# Patient Record
Sex: FEMALE | Race: WHITE | Employment: FULL TIME | URBAN - METROPOLITAN AREA
[De-identification: names, ages, dates, MRNs, and addresses within clinical notes are randomized per-mention and may not be internally consistent; named-entity substitution may affect disease eponyms.]

---

## 2017-01-12 DIAGNOSIS — F33.0 MAJOR DEPRESSIVE DISORDER, RECURRENT EPISODE, MILD (H): Primary | ICD-10-CM

## 2017-01-12 RX ORDER — TRAZODONE HYDROCHLORIDE 50 MG/1
50 TABLET, FILM COATED ORAL
Qty: 30 TABLET | Refills: 0 | Status: SHIPPED | OUTPATIENT
Start: 2017-01-12 | End: 2017-03-13

## 2017-01-12 NOTE — TELEPHONE ENCOUNTER
Medication is being filled for 1 time refill only due to:  Patient needs to be seen because it has been more than one year since last visit. Also due for PHQ-9.     Elsa Guan Pharm.D.  on behalf of [facility] Pharmacist   [email or web address]

## 2017-01-12 NOTE — TELEPHONE ENCOUNTER
Trazodone 50mg        Last Written Prescription Date: 1/8/16  Last Fill Quantity: 30; # refills: 11  Last Office Visit with [facility], [facility] or [facility] prescribing provider:  1/8/16        Last PHQ-9 score on record=   PHQ-9 SCORE 1/8/2016   Total Score -   Total Score 1       AST       24   1/8/2016  ALT       36   1/8/2016    Thank you!  Rosa Carrera   [facility]  P: [phone] F: [phone]

## 2017-02-06 DIAGNOSIS — F33.0 MAJOR DEPRESSIVE DISORDER, RECURRENT EPISODE, MILD (H): ICD-10-CM

## 2017-02-06 DIAGNOSIS — K50.90 CROHN'S DISEASE WITHOUT COMPLICATION, UNSPECIFIED GASTROINTESTINAL TRACT LOCATION (H): Primary | ICD-10-CM

## 2017-02-06 RX ORDER — CHOLESTYRAMINE 4 G/9G
1 POWDER, FOR SUSPENSION ORAL DAILY
Qty: 60 G | Refills: 0 | Status: SHIPPED | OUTPATIENT
Start: 2017-02-06 | End: 2017-03-30

## 2017-02-06 RX ORDER — CITALOPRAM HYDROBROMIDE 20 MG/1
20 TABLET ORAL DAILY
Qty: 30 TABLET | Refills: 0 | Status: SHIPPED | OUTPATIENT
Start: 2017-02-06 | End: 2017-03-30

## 2017-02-06 NOTE — TELEPHONE ENCOUNTER
Due for follow up, and there is an interaction with trazodone and celexa (QT prolongation) please advise on refills    Calvin Ambriz, Pharm D  [facility]  [phone]

## 2017-02-06 NOTE — TELEPHONE ENCOUNTER
Chlestyramine 4gm powder     Last Written Prescription Date: 01/08/16  Last Fill Quantity: 60, # refills: 11  Last Office Visit with [facility] or [facility] prescribing provider: 01/08/16       CHOL      172   1/8/2016  HDL       75   1/8/2016  LDL       77   1/8/2016  LDL       77   2/21/2013  TRIG       99   1/8/2016  CHOLHDLRATIO      2.0   1/29/2010    Citalopram   20  Last Written Prescription Date: 01/08/16  Last Fill Quantity: 90, # refills: 3  Last Office Visit with [facility] primary care provider:  01/08/16        Last PHQ-9 score on record=   PHQ-9 SCORE 1/8/2016   Total Score -   Total Score 1         Thank You!  Vivienne Patton  [facility]  P: [phone] F:[phone]

## 2017-03-13 DIAGNOSIS — Z78.0 MENOPAUSE: ICD-10-CM

## 2017-03-13 DIAGNOSIS — F33.0 MAJOR DEPRESSIVE DISORDER, RECURRENT EPISODE, MILD (H): ICD-10-CM

## 2017-03-13 NOTE — TELEPHONE ENCOUNTER
Estradiol 0.05      Last Written Prescription Date: 1/8/16  Last Fill Quantity: 24, # refills: 3  Last Office Visit with [facility], [facility] or [facility] prescribing provider: 1/18/17  Next 5 appointments (look out 90 days)     Apr 07, 2017  9:00 AM CDT   PHYSICAL with Yaima Aguilar MD   [facility] ([facility]    [address] [place] [address]   [phone]                   BP Readings from Last 3 Encounters:   01/08/16 123/74   10/27/14 111/78   05/01/14 104/76     Date of last Breast Exam: 0    Thank You!  Vivienne Patton  [facility]  P: [phone] F:[phone]

## 2017-03-13 NOTE — TELEPHONE ENCOUNTER
Trazodone 50       Last Written Prescription Date: 1/12/17  Last Fill Quantity: 30; # refills: 0  Last Office Visit with [facility], [facility] or  Health prescribing provider:  1/08/17   Next 5 appointments (look out 90 days)     Apr 07, 2017  9:00 AM CDT   PHYSICAL with Yaima Aguilar MD   [facility]    [address] [place] [address]   [phone]                   Last PHQ-9 score on record=   PHQ-9 SCORE 1/8/2016   Total Score -   Total Score 1       Lab Results   Component Value Date    AST 24 01/08/2016     Lab Results   Component Value Date    ALT 36 01/08/2016       Thank You!  Vivienne Patton  [facility]  P: [phone] F:[phone]

## 2017-03-14 RX ORDER — ESTRADIOL 0.05 MG/D
1 PATCH, EXTENDED RELEASE TRANSDERMAL
Qty: 8 PATCH | Refills: 0 | Status: SHIPPED | OUTPATIENT
Start: 2017-03-16 | End: 2017-05-08

## 2017-03-14 RX ORDER — TRAZODONE HYDROCHLORIDE 50 MG/1
50 TABLET, FILM COATED ORAL
Qty: 30 TABLET | Refills: 0 | Status: SHIPPED | OUTPATIENT
Start: 2017-03-14 | End: 2017-05-01

## 2017-03-30 DIAGNOSIS — F33.0 MAJOR DEPRESSIVE DISORDER, RECURRENT EPISODE, MILD (H): ICD-10-CM

## 2017-03-30 DIAGNOSIS — K50.90 CROHN'S DISEASE WITHOUT COMPLICATION, UNSPECIFIED GASTROINTESTINAL TRACT LOCATION (H): ICD-10-CM

## 2017-03-30 RX ORDER — CHOLESTYRAMINE 4 G/9G
1 POWDER, FOR SUSPENSION ORAL DAILY
Qty: 60 G | Refills: 0 | Status: SHIPPED | OUTPATIENT
Start: 2017-03-30 | End: 2017-05-08

## 2017-03-30 RX ORDER — CITALOPRAM HYDROBROMIDE 20 MG/1
20 TABLET ORAL DAILY
Qty: 30 TABLET | Refills: 0 | Status: SHIPPED | OUTPATIENT
Start: 2017-03-30 | End: 2017-05-01

## 2017-03-30 NOTE — TELEPHONE ENCOUNTER
Cholestyramine 4g     Last Written Prescription Date: 2/6/17  Last Fill Quantity: 60, # refills: 0  Last Office Visit with [facility], [facility] or  Health prescribing provider: 1/8/17  Next 5 appointments (look out 90 days)     Apr 07, 2017  9:00 AM CDT   PHYSICAL with Yaima Aguilar MD   [facility] ([facility])    [address] [place] [address]   [phone]                   Lab Results   Component Value Date    CHOL 172 01/08/2016     Lab Results   Component Value Date    HDL 75 01/08/2016     Lab Results   Component Value Date    LDL 77 01/08/2016     Lab Results   Component Value Date    TRIG 99 01/08/2016     Lab Results   Component Value Date    CHOLHDLRATIO 2.0 01/29/2010       Citalopram 20     Last Written Prescription Date: 2/6/17  Last Fill Quantity: 30, # refills: 0  Last Office Visit with [facility] primary care provider:  1/8/17   Next 5 appointments (look out 90 days)     Apr 07, 2017  9:00 AM CDT   PHYSICAL with Yaima Aguilar MD   [facility] ([facility])    [address] [place] [address]   [phone]                   Last PHQ-9 score on record=   PHQ-9 SCORE 1/8/2016   Total Score -   Total Score 1     Thank You!  Vivienne Patton  [facility]  P: [phone] F:[phone]

## 2017-03-30 NOTE — TELEPHONE ENCOUNTER
Routing refill request to provider for review/approval because:  Due for labs/PHQ9.    Narda Wilson, Pharm.D.  [facility]  [phone]

## 2017-05-01 DIAGNOSIS — F33.0 MAJOR DEPRESSIVE DISORDER, RECURRENT EPISODE, MILD (H): ICD-10-CM

## 2017-05-01 NOTE — TELEPHONE ENCOUNTER
citalopram (CELEXA) 20 MG tablet     Last Written Prescription Date: 3/30/17  Last Fill Quantity: 30, # refills: 0  Last Office Visit with [facility] primary care provider:  1/8/17   Next 5 appointments (look out 90 days)     May 08, 2017 10:20 AM CDT   PHYSICAL with Yaima Aguilar MD   [facility]    [address] [place] [address]   [phone]                   Last PHQ-9 score on record=   PHQ-9 SCORE 1/8/2016   Total Score -   Total Score 1         traZODone (DESYREL) 50 MG tablet       Last Written Prescription Date: 3/14/17  Last Fill Quantity: 30; # refills: 0  Last Office Visit with [facility], [facility] or [facility] prescribing provider:  1/18/17   Next 5 appointments (look out 90 days)     May 08, 2017 10:20 AM CDT   PHYSICAL with Yaima Aguilar MD   [facility] ([facility])    [address] [place] [address]   [phone]                   Last PHQ-9 score on record=   PHQ-9 SCORE 1/8/2016   Total Score -   Total Score 1       Lab Results   Component Value Date    AST 24 01/08/2016     Lab Results   Component Value Date    ALT 36 01/08/2016

## 2017-05-02 RX ORDER — CITALOPRAM HYDROBROMIDE 20 MG/1
TABLET ORAL
Qty: 30 TABLET | Refills: 0 | Status: SHIPPED | OUTPATIENT
Start: 2017-05-02 | End: 2017-05-08

## 2017-05-02 RX ORDER — TRAZODONE HYDROCHLORIDE 50 MG/1
TABLET, FILM COATED ORAL
Qty: 30 TABLET | Refills: 0 | Status: SHIPPED | OUTPATIENT
Start: 2017-05-02 | End: 2017-05-08

## 2017-05-08 ENCOUNTER — OFFICE VISIT (OUTPATIENT)
Dept: FAMILY MEDICINE | Facility: CLINIC | Age: 62
End: 2017-05-08
Payer: COMMERCIAL

## 2017-05-08 VITALS
SYSTOLIC BLOOD PRESSURE: 112 MMHG | TEMPERATURE: 97 F | DIASTOLIC BLOOD PRESSURE: 66 MMHG | HEIGHT: 63 IN | HEART RATE: 64 BPM | BODY MASS INDEX: 26.33 KG/M2 | WEIGHT: 148.6 LBS

## 2017-05-08 DIAGNOSIS — Z78.0 MENOPAUSE: ICD-10-CM

## 2017-05-08 DIAGNOSIS — Z11.59 NEED FOR HEPATITIS C SCREENING TEST: ICD-10-CM

## 2017-05-08 DIAGNOSIS — K50.90 CROHN'S DISEASE WITHOUT COMPLICATION, UNSPECIFIED GASTROINTESTINAL TRACT LOCATION (H): ICD-10-CM

## 2017-05-08 DIAGNOSIS — Z00.00 ENCOUNTER FOR WELL ADULT EXAM WITHOUT ABNORMAL FINDINGS: Primary | ICD-10-CM

## 2017-05-08 DIAGNOSIS — F33.0 MAJOR DEPRESSIVE DISORDER, RECURRENT EPISODE, MILD (H): ICD-10-CM

## 2017-05-08 LAB
BASOPHILS # BLD AUTO: 0 10E9/L (ref 0–0.2)
BASOPHILS NFR BLD AUTO: 0.3 %
DIFFERENTIAL METHOD BLD: ABNORMAL
EOSINOPHIL # BLD AUTO: 0.1 10E9/L (ref 0–0.7)
EOSINOPHIL NFR BLD AUTO: 1.9 %
ERYTHROCYTE [DISTWIDTH] IN BLOOD BY AUTOMATED COUNT: 12.6 % (ref 10–15)
HCT VFR BLD AUTO: 38.2 % (ref 35–47)
HGB BLD-MCNC: 12.8 G/DL (ref 11.7–15.7)
LYMPHOCYTES # BLD AUTO: 1.2 10E9/L (ref 0.8–5.3)
LYMPHOCYTES NFR BLD AUTO: 31.1 %
MCH RBC QN AUTO: 29.8 PG (ref 26.5–33)
MCHC RBC AUTO-ENTMCNC: 33.5 G/DL (ref 31.5–36.5)
MCV RBC AUTO: 89 FL (ref 78–100)
MONOCYTES # BLD AUTO: 0.3 10E9/L (ref 0–1.3)
MONOCYTES NFR BLD AUTO: 8 %
NEUTROPHILS # BLD AUTO: 2.2 10E9/L (ref 1.6–8.3)
NEUTROPHILS NFR BLD AUTO: 58.7 %
PLATELET # BLD AUTO: 190 10E9/L (ref 150–450)
RBC # BLD AUTO: 4.29 10E12/L (ref 3.8–5.2)
WBC # BLD AUTO: 3.7 10E9/L (ref 4–11)

## 2017-05-08 PROCEDURE — 36415 COLL VENOUS BLD VENIPUNCTURE: CPT | Performed by: FAMILY MEDICINE

## 2017-05-08 PROCEDURE — 85025 COMPLETE CBC W/AUTO DIFF WBC: CPT | Performed by: FAMILY MEDICINE

## 2017-05-08 PROCEDURE — 99396 PREV VISIT EST AGE 40-64: CPT | Performed by: FAMILY MEDICINE

## 2017-05-08 PROCEDURE — 86803 HEPATITIS C AB TEST: CPT | Performed by: FAMILY MEDICINE

## 2017-05-08 RX ORDER — ESTRADIOL 0.05 MG/D
1 PATCH, EXTENDED RELEASE TRANSDERMAL
Qty: 8 PATCH | Refills: 11 | Status: SHIPPED | OUTPATIENT
Start: 2017-05-08 | End: 2019-09-24

## 2017-05-08 RX ORDER — TRAZODONE HYDROCHLORIDE 50 MG/1
TABLET, FILM COATED ORAL
Qty: 30 TABLET | Refills: 11 | Status: SHIPPED | OUTPATIENT
Start: 2017-05-08 | End: 2018-06-18

## 2017-05-08 RX ORDER — CITALOPRAM HYDROBROMIDE 20 MG/1
20 TABLET ORAL DAILY
Qty: 30 TABLET | Refills: 11 | Status: SHIPPED | OUTPATIENT
Start: 2017-05-08 | End: 2018-06-18

## 2017-05-08 RX ORDER — CHOLESTYRAMINE 4 G/9G
1 POWDER, FOR SUSPENSION ORAL DAILY
Qty: 60 G | Refills: 11 | Status: SHIPPED | OUTPATIENT
Start: 2017-05-08 | End: 2018-05-09

## 2017-05-08 NOTE — PATIENT INSTRUCTIONS
Preventive Health Recommendations  Female Ages 50 - 64    Yearly exam: See your health care provider every year in order to  o Review health changes.   o Discuss preventive care.    o Review your medicines if your doctor has prescribed any.      Get a Pap test every three years (unless you have an abnormal result and your provider advises testing more often).    If you get Pap tests with HPV test, you only need to test every 5 years, unless you have an abnormal result.     You do not need a Pap test if your uterus was removed (hysterectomy) and you have not had cancer.    You should be tested each year for STDs (sexually transmitted diseases) if you're at risk.     Have a mammogram every 1 to 2 years.    Have a colonoscopy at age 50, or have a yearly FIT test (stool test). These exams screen for colon cancer.      Have a cholesterol test every 5 years, or more often if advised.    Have a diabetes test (fasting glucose) every three years. If you are at risk for diabetes, you should have this test more often.     If you are at risk for osteoporosis (brittle bone disease), think about having a bone density scan (DEXA).    Shots: Get a flu shot each year. Get a tetanus shot every 10 years.    Nutrition:     Eat at least 5 servings of fruits and vegetables each day.    Eat whole-grain bread, whole-wheat pasta and brown rice instead of white grains and rice.    Talk to your provider about Calcium and Vitamin D.     Lifestyle    Exercise at least 150 minutes a week (30 minutes a day, 5 days a week). This will help you control your weight and prevent disease.    Limit alcohol to one drink per day.    No smoking.     Wear sunscreen to prevent skin cancer.     See your dentist every six months for an exam and cleaning.    See your eye doctor every 1 to 2 years.      [place] Mammo Schedule   [place]- [phone]  2nd/4th Monday morning   Every other Wednesday afternoon   [place]- [phone]  2nd/4th Wednesday  morning  [place]- [phone]  1st/3rd Wednesday morning   [facility]- [phone]  2nd/4th Monday afternoon   Every other Wednesday afternoon   [place]- [phone]  Every Monday morning   Every Tuesday afternoon   Every Wednesday, Thursday, Friday   Mammogram walk-in hours in [place]: Monday-Friday, 8 a.m. - 4 p.m.  Questions? Call [phone].     Labs today     meds refilled

## 2017-05-08 NOTE — PROGRESS NOTES
SUBJECTIVE:     CC: Jenny Banks is an 61 year old woman who presents for preventive health visit.     The 10-year ASCVD risk score (Nemoaarti MORENO Jr, et al., 2013) is: 1.6%    Values used to calculate the score:      Age: 61 years      Sex: Female      Is Non- : No      Diabetic: No      Tobacco smoker: No      Systolic Blood Pressure: 112 mmHg      Is BP treated: No      HDL Cholesterol: 75 mg/dL      Total Cholesterol: 172 mg/dL    Healthy Habits:    Do you get at least three servings of calcium containing foods daily (dairy, green leafy vegetables, etc.)? yes    Amount of exercise or daily activities, outside of work: 3 day(s) per week    Problems taking medications regularly No    Medication side effects: No    Have you had an eye exam in the past two years? no    Do you see a dentist twice per year? yes    Do you have sleep apnea, excessive snoring or daytime drowsiness?no    Patient is eligible for use of low-dose aspirin for primary prevention of heart attack and stroke.  Provider has discussed aspirin with patient and our decision was:     Contraindicated:  Daily low-dose aspirin for primary prevention contraindicated, provider does not recommend.              Depression Followup    Status since last visit: Stable     See PHQ-9 for current symptoms.  Other associated symptoms: None    Complicating factors:   Significant life event:  No   Current substance abuse:  None  Anxiety or Panic symptoms:  No    PHQ-9  English PHQ-9   Any Language            Today's PHQ-2 Score:   PHQ-2 ( 1999 [employer]) 1/8/2016 10/27/2014   Q1: Little interest or pleasure in doing things 0 0   Q2: Feeling down, depressed or hopeless 0 0   PHQ-2 Score 0 0       Abuse: Current or Past(Physical, Sexual or Emotional)- No  Do you feel safe in your environment - Yes    Social History   Substance Use Topics     Smoking status: Former Smoker     Types: Cigarettes     Quit date: 5/10/1994     Smokeless tobacco: Never  "Used      Comment: quit 1995     Alcohol use No     The patient does not drink >3 drinks per day nor >7 drinks per week.    Recent Labs   Lab Test  01/08/16   1031  02/21/13   0825  01/29/10   1039   CHOL  172   --   182   HDL  75   --   74   LDL  77  77  81   TRIG  99   --   132   CHOLHDLRATIO   --    --   2.0   NHDL  97   --    --        Reviewed orders with patient.  Reviewed health maintenance and updated orders accordingly - Yes    Mammo Decision Support:  Patient over age 50, mutual decision to screen reflected in health maintenance.    Pertinent mammograms are reviewed under the imaging tab.   Status post benign hysterectomy. Health Maintenance and Surgical History updated.    Reviewed and updated as needed this visit by clinical staff  Tobacco  Allergies  Meds  Problems  Med Hx  Surg Hx  Fam Hx  Soc Hx          Reviewed and updated as needed this visit by Provider  Allergies  Meds  Problems            ROS:  C: NEGATIVE for fever, chills, change in weight  I: NEGATIVE for worrisome rashes, moles or lesions  E: NEGATIVE for vision changes or irritation  ENT: NEGATIVE for ear, mouth and throat problems  R: NEGATIVE for significant cough or SOB  B: NEGATIVE for masses, tenderness or discharge  CV: NEGATIVE for chest pain, palpitations or peripheral edema  GI: NEGATIVE for nausea, abdominal pain, heartburn, or change in bowel habits  : NEGATIVE for unusual urinary or vaginal symptoms. No vaginal bleeding.  M: NEGATIVE for significant arthralgias or myalgia  N: NEGATIVE for weakness, dizziness or paresthesias  P: NEGATIVE for changes in mood or affect     Problem list, Medication list, Allergies, and Medical/Social/Surgical histories reviewed in [facility] and updated as appropriate.  OBJECTIVE:     /66 (BP Location: Right arm, Patient Position: Chair, Cuff Size: Adult Large)  Pulse 64  Temp 97  F (36.1  C) (Tympanic)  Ht 5' 3\" (1.6 m)  Wt 148 lb 9.6 oz (67.4 kg)  LMP 06/14/2006  BMI 26.32 " kg/m2  EXAM:  GENERAL: healthy, alert and no distress  EYES: Eyes grossly normal to inspection, PERRL and conjunctivae and sclerae normal  HENT: ear canals and TM's normal, nose and mouth without ulcers or lesions  NECK: no adenopathy, no asymmetry, masses, or scars and thyroid normal to palpation  RESP: lungs clear to auscultation - no rales, rhonchi or wheezes  BREAST: normal without masses, tenderness or nipple discharge and no palpable axillary masses or adenopathy  CV: regular rate and rhythm, normal S1 S2, no S3 or S4, no murmur, click or rub, no peripheral edema and peripheral pulses strong  ABDOMEN: soft, nontender, no hepatosplenomegaly, no masses and bowel sounds normal  MS: no gross musculoskeletal defects noted, no edema  SKIN: no suspicious lesions or rashes  NEURO: Normal strength and tone, mentation intact and speech normal  PSYCH: mentation appears normal, affect normal/bright    ASSESSMENT/PLAN:     1. Encounter for well adult exam without abnormal findings      2. Major depressive disorder, recurrent episode, mild (H)  Stable no change in treatment plan.   - traZODone (DESYREL) 50 MG tablet; TAKE ONE TABLET BY MOUTH NIGHTLY AS NEEDED FOR SLEEP  Dispense: 30 tablet; Refill: 11  - citalopram (CELEXA) 20 MG tablet; Take 1 tablet (20 mg) by mouth daily  Dispense: 30 tablet; Refill: 11    3. Menopause  Stable no change in treatment plan.   - estradiol (VIVELLE-DOT) 0.05 MG/24HR BIW patch; Place 1 patch onto the skin twice a week Due for annual  Dispense: 8 patch; Refill: 11    4. Crohn's disease without complication, unspecified gastrointestinal tract location (H)  Stable no change in treatment plan.   - cholestyramine (QUESTRAN) 4 GM/DOSE powder; Take 1 g by mouth daily 1 scoop  CHOLESTYRAMINE LIGHT POWDER  Dispense: 60 g; Refill: 11  - CBC with platelets differential    5. Need for hepatitis C screening test    - Hepatitis C Screen Reflex to HCV RNA Quant and Genotype    COUNSELING:   Reviewed  "preventive health counseling, as reflected in patient instructions         reports that she quit smoking about 23 years ago. Her smoking use included Cigarettes. She has never used smokeless tobacco.    Estimated body mass index is 26.32 kg/(m^2) as calculated from the following:    Height as of this encounter: 5' 3\" (1.6 m).    Weight as of this encounter: 148 lb 9.6 oz (67.4 kg).       Counseling Resources:  ATP IV Guidelines  Pooled Cohorts Equation Calculator  Breast Cancer Risk Calculator  FRAX Risk Assessment  ICSI Preventive Guidelines  Dietary Guidelines for Americans, 2010  USDA's MyPlate  ASA Prophylaxis  Lung CA Screening    Yaima Aguilar MD  [facility]  "

## 2017-05-08 NOTE — NURSING NOTE
"Chief Complaint   Patient presents with     Physical       Initial /66 (BP Location: Right arm, Patient Position: Chair, Cuff Size: Adult Large)  Pulse 64  Temp 97  F (36.1  C) (Tympanic)  Ht 5' 3\" (1.6 m)  Wt 148 lb 9.6 oz (67.4 kg)  LMP 06/14/2006  BMI 26.32 kg/m2 Estimated body mass index is 26.32 kg/(m^2) as calculated from the following:    Height as of this encounter: 5' 3\" (1.6 m).    Weight as of this encounter: 148 lb 9.6 oz (67.4 kg).  Medication Reconciliation: complete        "

## 2017-05-08 NOTE — MR AVS SNAPSHOT
After Visit Summary   5/8/2017    Jenny Banks    MRN: [id number]           Patient Information     Date Of Birth          1955        Visit Information        Provider Department      5/8/2017 10:20 AM Yaima Aguilar MD [facility]        Today's Diagnoses     Need for hepatitis C screening test    -  1    Encounter for well adult exam without abnormal findings        Major depressive disorder, recurrent episode, mild (H)        Menopause        Crohn's disease without complication, unspecified gastrointestinal tract location (H)          Care Instructions      Preventive Health Recommendations  Female Ages 50 - 64    Yearly exam: See your health care provider every year in order to  o Review health changes.   o Discuss preventive care.    o Review your medicines if your doctor has prescribed any.      Get a Pap test every three years (unless you have an abnormal result and your provider advises testing more often).    If you get Pap tests with HPV test, you only need to test every 5 years, unless you have an abnormal result.     You do not need a Pap test if your uterus was removed (hysterectomy) and you have not had cancer.    You should be tested each year for STDs (sexually transmitted diseases) if you're at risk.     Have a mammogram every 1 to 2 years.    Have a colonoscopy at age 50, or have a yearly FIT test (stool test). These exams screen for colon cancer.      Have a cholesterol test every 5 years, or more often if advised.    Have a diabetes test (fasting glucose) every three years. If you are at risk for diabetes, you should have this test more often.     If you are at risk for osteoporosis (brittle bone disease), think about having a bone density scan (DEXA).    Shots: Get a flu shot each year. Get a tetanus shot every 10 years.    Nutrition:     Eat at least 5 servings of fruits and vegetables each day.    Eat whole-grain bread, whole-wheat pasta and  brown rice instead of white grains and rice.    Talk to your provider about Calcium and Vitamin D.     Lifestyle    Exercise at least 150 minutes a week (30 minutes a day, 5 days a week). This will help you control your weight and prevent disease.    Limit alcohol to one drink per day.    No smoking.     Wear sunscreen to prevent skin cancer.     See your dentist every six months for an exam and cleaning.    See your eye doctor every 1 to 2 years.      [place] Mammo Schedule   [place]- [phone]  2nd/4th Monday morning   Every other Wednesday afternoon   [place]- [phone]  2nd/4th Wednesday morning  [place]- [phone]  1st/3rd Wednesday morning   [facility]- [phone]  2nd/4th Monday afternoon   Every other Wednesday afternoon   [place]- [phone]  Every Monday morning   Every Tuesday afternoon   Every Wednesday, Thursday, Friday   Mammogram walk-in hours in [place]: Monday-Friday, 8 a.m. - 4 p.m.  Questions? Call [phone].     Labs today     meds refilled         Follow-ups after your visit        Who to contact     If you have questions or need follow up information about today's clinic visit or your schedule please contact [facility] directly at [phone].  Normal or non-critical lab and imaging results will be communicated to you by [employer], letter or phone within 4 business days after the clinic has received the results. If you do not hear from us within 7 days, please contact the clinic through [employer] or phone. If you have a critical or abnormal lab result, we will notify you by phone as soon as possible.  Submit refill requests through [employer] or call your pharmacy and they will forward the refill request to us. Please allow 3 business days for your refill to be completed.          Additional Information About Your Visit        [employer] Information     [employer] gives you secure access to your electronic health record. If you see a primary care provider, you can  "also send messages to your care team and make appointments. If you have questions, please call your primary care clinic.  If you do not have a primary care provider, please call [phone] and they will assist you.        Care EveryWhere ID     This is your Care EveryWhere ID. This could be used by other organizations to access your [facility] medical records  [id number]        Your Vitals Were     Pulse Temperature Height Last Period BMI (Body Mass Index)       64 97  F (36.1  C) (Tympanic) 5' 3\" (1.6 m) 06/14/2006 26.32 kg/m2        Blood Pressure from Last 3 Encounters:   05/08/17 112/66   01/08/16 123/74   10/27/14 111/78    Weight from Last 3 Encounters:   05/08/17 148 lb 9.6 oz (67.4 kg)   01/08/16 146 lb 6.4 oz (66.4 kg)   10/27/14 152 lb 3.2 oz (69 kg)              We Performed the Following     CBC with platelets differential     DEPRESSION ACTION PLAN (DAP)     Hepatitis C Screen Reflex to HCV RNA Quant and Genotype          Today's Medication Changes          These changes are accurate as of: 5/8/17 10:51 AM.  If you have any questions, ask your nurse or doctor.               These medicines have changed or have updated prescriptions.        Dose/Directions    citalopram 20 MG tablet   Commonly known as:  celeXA   This may have changed:  See the new instructions.   Used for:  Major depressive disorder, recurrent episode, mild (H)   Changed by:  aYima Aguilar MD        Dose:  20 mg   Take 1 tablet (20 mg) by mouth daily   Quantity:  30 tablet   Refills:  11       traZODone 50 MG tablet   Commonly known as:  DESYREL   This may have changed:  See the new instructions.   Used for:  Major depressive disorder, recurrent episode, mild (H)   Changed by:  Yaima Aguilar MD        TAKE ONE TABLET BY MOUTH NIGHTLY AS NEEDED FOR SLEEP   Quantity:  30 tablet   Refills:  11            Where to get your medicines      These medications were sent to [facility], [place] - " [address] [place] [address]  [address] [place] [address]     Phone:  [phone]     cholestyramine 4 GM/DOSE powder    citalopram 20 MG tablet    estradiol 0.05 MG/24HR BIW patch    traZODone 50 MG tablet                Primary Care Provider Office Phone # Fax #    Yaima Roge Aguilar [phone]       [facility] [address] [place] [address]        Thank you!     Thank you for choosing [facility]  for your care. Our goal is always to provide you with excellent care. Hearing back from our patients is one way we can continue to improve our services. Please take a few minutes to complete the written survey that you may receive in the mail after your visit with us. Thank you!             Your Updated Medication List - Protect others around you: Learn how to safely use, store and throw away your medicines at www.disposemymeds.org.          This list is accurate as of: 5/8/17 10:51 AM.  Always use your most recent med list.                   Brand Name Dispense Instructions for use    Biotin 10 MG Tabs tablet      Take 10,000 mcg by mouth 2 times daily       cholestyramine 4 GM/DOSE powder    QUESTRAN    60 g    Take 1 g by mouth daily 1 scoop  CHOLESTYRAMINE LIGHT POWDER       citalopram 20 MG tablet    celeXA    30 tablet    Take 1 tablet (20 mg) by mouth daily       CLARITIN-D 12 HOUR PO      Take  by mouth.       estradiol 0.05 MG/24HR BIW patch    VIVELLE-DOT    8 patch    Place 1 patch onto the skin twice a week Due for annual       MULTIVITAMIN PO      one daily       traZODone 50 MG tablet    DESYREL    30 tablet    TAKE ONE TABLET BY MOUTH NIGHTLY AS NEEDED FOR SLEEP

## 2017-05-09 LAB — HCV AB SERPL QL IA: NORMAL

## 2017-09-22 ENCOUNTER — ALLIED HEALTH/NURSE VISIT (OUTPATIENT)
Dept: FAMILY MEDICINE | Facility: CLINIC | Age: 62
End: 2017-09-22
Payer: COMMERCIAL

## 2017-09-22 DIAGNOSIS — Z23 NEED FOR PROPHYLACTIC VACCINATION AND INOCULATION AGAINST INFLUENZA: Primary | ICD-10-CM

## 2017-09-22 PROCEDURE — 90471 IMMUNIZATION ADMIN: CPT

## 2017-09-22 PROCEDURE — 99207 ZZC NO CHARGE NURSE ONLY: CPT

## 2017-09-22 PROCEDURE — 90686 IIV4 VACC NO PRSV 0.5 ML IM: CPT

## 2017-09-22 NOTE — MR AVS SNAPSHOT
After Visit Summary   9/22/2017    Jenny Banks    MRN: [id number]           Patient Information     Date Of Birth          1955        Visit Information        Provider Department      9/22/2017 2:30 PM FL DEVON BRAVO/LPN [facility]        Today's Diagnoses     Need for prophylactic vaccination and inoculation against influenza    -  1       Follow-ups after your visit        Who to contact     If you have questions or need follow up information about today's clinic visit or your schedule please contact [facility] directly at [phone].  Normal or non-critical lab and imaging results will be communicated to you by Mobiveryhart, letter or phone within 4 business days after the clinic has received the results. If you do not hear from us within 7 days, please contact the clinic through [employer] or phone. If you have a critical or abnormal lab result, we will notify you by phone as soon as possible.  Submit refill requests through [facility] or call your pharmacy and they will forward the refill request to us. Please allow 3 business days for your refill to be completed.          Additional Information About Your Visit        MyChart Information     [facility] gives you secure access to your electronic health record. If you see a primary care provider, you can also send messages to your care team and make appointments. If you have questions, please call your primary care clinic.  If you do not have a primary care provider, please call [phone] and they will assist you.        Care EveryWhere ID     This is your Care EveryWhere ID. This could be used by other organizations to access your [place] medical records  [id number]        Your Vitals Were     Last Period                   06/14/2006            Blood Pressure from Last 3 Encounters:   05/08/17 112/66   01/08/16 123/74   10/27/14 111/78    Weight from Last 3 Encounters:   05/08/17 148 lb 9.6 oz (67.4 kg)   01/08/16  146 lb 6.4 oz (66.4 kg)   10/27/14 152 lb 3.2 oz (69 kg)              We Performed the Following     FLU VAC, SPLIT VIRUS IM > 3 YO (QUADRIVALENT) [39578]     Vaccine Administration, Initial [16708]        Primary Care Provider Office Phone # Fax #    Yaima Aguilar [phone] [address] [place] [address]        Equal Access to Services     MEHNAZ MANN : Hadii aad ku hadasho Soomaali, waaxda luqadaha, qaybta kaalmada adeegyada, waxay idiin hayaan adeeg kharacristhian lajannette . So [facility] [phone].    ATENCIÓN: Si lokesh curiel, tiene a francis disposición servicios gratuitos de asistencia lingüística. Ll[facility] [phone].    We comply with applicable federal civil rights laws and [place] laws. We do not discriminate on the basis of race, color, national origin, age, disability sex, sexual orientation or gender identity.            Thank you!     Thank you for choosing [facility]  for your care. Our goal is always to provide you with excellent care. Hearing back from our patients is one way we can continue to improve our services. Please take a few minutes to complete the written survey that you may receive in the mail after your visit with us. Thank you!             Your Updated Medication List - Protect others around you: Learn how to safely use, store and throw away your medicines at www.disposemymeds.org.          This list is accurate as of: 9/22/17  2:34 PM.  Always use your most recent med list.                   Brand Name Dispense Instructions for use Diagnosis    Biotin 10 MG Tabs tablet      Take 10,000 mcg by mouth 2 times daily        cholestyramine 4 GM/DOSE powder    QUESTRAN    60 g    Take 1 g by mouth daily 1 scoop  CHOLESTYRAMINE LIGHT POWDER    Crohn's disease without complication, unspecified gastrointestinal tract location (H)       citalopram 20 MG tablet    celeXA    30 tablet    Take 1 tablet (20 mg) by mouth daily    Major depressive disorder,  recurrent episode, mild (H)       CLARITIN-D 12 HOUR PO      Take  by mouth.        estradiol 0.05 MG/24HR BIW patch    VIVELLE-DOT    8 patch    Place 1 patch onto the skin twice a week Due for annual    Menopause       MULTIVITAMIN PO      one daily        traZODone 50 MG tablet    DESYREL    30 tablet    TAKE ONE TABLET BY MOUTH NIGHTLY AS NEEDED FOR SLEEP    Major depressive disorder, recurrent episode, mild (H)

## 2017-09-22 NOTE — PROGRESS NOTES
Injectable Influenza Immunization Documentation    1.  Is the person to be vaccinated sick today?   No    2. Does the person to be vaccinated have an allergy to a component   of the vaccine?   No    3. Has the person to be vaccinated ever had a serious reaction   to influenza vaccine in the past?   No    4. Has the person to be vaccinated ever had Guillain-Barré syndrome?   No    Form completed by sb

## 2017-09-28 ENCOUNTER — TELEPHONE (OUTPATIENT)
Dept: FAMILY MEDICINE | Facility: CLINIC | Age: 62
End: 2017-09-28

## 2017-09-28 NOTE — TELEPHONE ENCOUNTER
Called the patient and reminded her that she is due for a colonoscopy and mammogram this year. Judy Cordova, cma

## 2018-04-30 ENCOUNTER — TELEPHONE (OUTPATIENT)
Dept: FAMILY MEDICINE | Facility: CLINIC | Age: 63
End: 2018-04-30

## 2018-04-30 DIAGNOSIS — R07.0 THROAT PAIN: ICD-10-CM

## 2018-04-30 DIAGNOSIS — R07.0 THROAT PAIN: Primary | ICD-10-CM

## 2018-04-30 LAB
DEPRECATED S PYO AG THROAT QL EIA: NORMAL
SPECIMEN SOURCE: NORMAL

## 2018-04-30 PROCEDURE — 87880 STREP A ASSAY W/OPTIC: CPT | Performed by: FAMILY MEDICINE

## 2018-04-30 PROCEDURE — 87081 CULTURE SCREEN ONLY: CPT | Performed by: FAMILY MEDICINE

## 2018-04-30 NOTE — LETTER
May 1, 2018      Jenny Banks  [address]   [facility] [place] [facility] [address]        Dear Jenny,         This letter is to inform you that the results of your recent throat culture are negative.  If you have any questions please call or make an appointment.          Sincerely,        Yaima Aguilar MD

## 2018-04-30 NOTE — TELEPHONE ENCOUNTER
Dr Aguilar OK'd Rapid TC for Jenny. She states her grandchildren and kids have strep. She has ST and white spots and is going to be going out of town.  Please put in orders. She will come at 1:15 today.

## 2018-05-01 LAB
BACTERIA SPEC CULT: NORMAL
SPECIMEN SOURCE: NORMAL

## 2018-06-18 DIAGNOSIS — F33.0 MAJOR DEPRESSIVE DISORDER, RECURRENT EPISODE, MILD (H): ICD-10-CM

## 2018-06-18 RX ORDER — TRAZODONE HYDROCHLORIDE 50 MG/1
TABLET, FILM COATED ORAL
Qty: 30 TABLET | Refills: 0 | Status: SHIPPED | OUTPATIENT
Start: 2018-06-18 | End: 2018-09-11

## 2018-06-18 RX ORDER — CITALOPRAM HYDROBROMIDE 20 MG/1
TABLET ORAL
Qty: 30 TABLET | Refills: 0 | Status: SHIPPED | OUTPATIENT
Start: 2018-06-18 | End: 2018-07-23

## 2018-06-18 NOTE — TELEPHONE ENCOUNTER
"Requested Prescriptions   Pending Prescriptions Disp Refills     citalopram (CELEXA) 20 MG tablet [Pharmacy Med Name: CITALOPRAM 20MG TAB] 30 tablet 11     Sig: TAKE ONE TABLET BY MOUTH EVERY DAY    SSRIs Protocol Failed    6/18/2018 10:39 AM       Failed - PHQ-9 score less than 5 in past 6 months    Please review last PHQ-9 score.          Failed - Recent (6 mo) or future (30 days) visit within the authorizing provider's specialty    Patient had office visit in the last 6 months or has a visit in the next 30 days with authorizing provider or within the authorizing provider's specialty.  See \"Patient Info\" tab in inbasket, or \"Choose Columns\" in Meds & Orders section of the refill encounter.           Passed - Patient is age 18 or older       Passed - No active pregnancy on record       Passed - No positive pregnancy test in last 12 months        traZODone (DESYREL) 50 MG tablet [Pharmacy Med Name: TRAZODONE HCL 50MG TABS] 30 tablet 11     Sig: TAKE ONE TABLET BY MOUTH NIGHTLY AS NEEDED FOR SLEEP    Serotonin Modulators Failed    6/18/2018 10:39 AM       Failed - Recent (12 mo) or future (30 days) visit within the authorizing provider's specialty    Patient had office visit in the last 12 months or has a visit in the next 30 days with authorizing provider or within the authorizing provider's specialty.  See \"Patient Info\" tab in inbasket, or \"Choose Columns\" in Meds & Orders section of the refill encounter.           Passed - Patient is age 18 or older       Passed - No active pregnancy on record       Passed - No positive pregnancy test in past 12 months      Citalopram, trazadone  Last Written Prescription Date:  5/8/17  Last Fill Quantity: 30,  # refills: 11   Last office visit: 9/22/2017 with prescribing provider:      Future Office Visit:      "

## 2018-07-23 DIAGNOSIS — F33.0 MAJOR DEPRESSIVE DISORDER, RECURRENT EPISODE, MILD (H): ICD-10-CM

## 2018-07-23 NOTE — TELEPHONE ENCOUNTER
Requested Prescriptions   Pending Prescriptions Disp Refills     citalopram (CELEXA) 20 MG tablet  Last Written Prescription Date:  06/18/18  Last Fill Quantity: 30,  # refills: 0   Last office visit: 05/08/2017 with prescribing provider:  OWEN Aguilar   Future Office Visit:     30 tablet 0     Sig: Take 1 tablet (20 mg) by mouth daily    There is no refill protocol information for this order   LUCIANA-7 SCORE 4/24/2014 10/27/2014 1/8/2016   Total Score 2 0 -   Total Score - - 1     PHQ-9 SCORE 10/27/2014 8/26/2015 1/8/2016   Total Score 0 - -   Total Score - 3 1

## 2018-07-24 RX ORDER — CITALOPRAM HYDROBROMIDE 20 MG/1
20 TABLET ORAL DAILY
Qty: 30 TABLET | Refills: 0 | Status: SHIPPED | OUTPATIENT
Start: 2018-07-24 | End: 2018-09-11

## 2018-09-11 ENCOUNTER — OFFICE VISIT (OUTPATIENT)
Dept: FAMILY MEDICINE | Facility: CLINIC | Age: 63
End: 2018-09-11
Payer: COMMERCIAL

## 2018-09-11 VITALS
WEIGHT: 153 LBS | HEIGHT: 63 IN | SYSTOLIC BLOOD PRESSURE: 120 MMHG | BODY MASS INDEX: 27.11 KG/M2 | HEART RATE: 60 BPM | DIASTOLIC BLOOD PRESSURE: 74 MMHG | RESPIRATION RATE: 16 BRPM | TEMPERATURE: 97.4 F

## 2018-09-11 DIAGNOSIS — Z12.11 SPECIAL SCREENING FOR MALIGNANT NEOPLASMS, COLON: ICD-10-CM

## 2018-09-11 DIAGNOSIS — F33.0 MAJOR DEPRESSIVE DISORDER, RECURRENT EPISODE, MILD (H): ICD-10-CM

## 2018-09-11 DIAGNOSIS — Z12.31 ENCOUNTER FOR SCREENING MAMMOGRAM FOR BREAST CANCER: ICD-10-CM

## 2018-09-11 DIAGNOSIS — Z00.00 ENCOUNTER FOR WELL ADULT EXAM WITHOUT ABNORMAL FINDINGS: Primary | ICD-10-CM

## 2018-09-11 DIAGNOSIS — K50.90 CROHN'S DISEASE WITHOUT COMPLICATION, UNSPECIFIED GASTROINTESTINAL TRACT LOCATION (H): ICD-10-CM

## 2018-09-11 LAB
ALBUMIN SERPL-MCNC: 3.8 G/DL (ref 3.4–5)
ALP SERPL-CCNC: 77 U/L (ref 40–150)
ALT SERPL W P-5'-P-CCNC: 39 U/L (ref 0–50)
ANION GAP SERPL CALCULATED.3IONS-SCNC: 3 MMOL/L (ref 3–14)
AST SERPL W P-5'-P-CCNC: 31 U/L (ref 0–45)
BASOPHILS # BLD AUTO: 0 10E9/L (ref 0–0.2)
BASOPHILS NFR BLD AUTO: 0.4 %
BILIRUB DIRECT SERPL-MCNC: <0.1 MG/DL (ref 0–0.2)
BILIRUB SERPL-MCNC: 0.3 MG/DL (ref 0.2–1.3)
BUN SERPL-MCNC: 19 MG/DL (ref 7–30)
CALCIUM SERPL-MCNC: 8.4 MG/DL (ref 8.5–10.1)
CHLORIDE SERPL-SCNC: 105 MMOL/L (ref 94–109)
CO2 SERPL-SCNC: 30 MMOL/L (ref 20–32)
CREAT SERPL-MCNC: 0.85 MG/DL (ref 0.52–1.04)
DIFFERENTIAL METHOD BLD: NORMAL
EOSINOPHIL # BLD AUTO: 0.1 10E9/L (ref 0–0.7)
EOSINOPHIL NFR BLD AUTO: 2.8 %
ERYTHROCYTE [DISTWIDTH] IN BLOOD BY AUTOMATED COUNT: 12.2 % (ref 10–15)
GFR SERPL CREATININE-BSD FRML MDRD: 68 ML/MIN/1.7M2
GLUCOSE SERPL-MCNC: 88 MG/DL (ref 70–99)
HCT VFR BLD AUTO: 38.2 % (ref 35–47)
HGB BLD-MCNC: 13 G/DL (ref 11.7–15.7)
LYMPHOCYTES # BLD AUTO: 1.2 10E9/L (ref 0.8–5.3)
LYMPHOCYTES NFR BLD AUTO: 25.4 %
MCH RBC QN AUTO: 30.6 PG (ref 26.5–33)
MCHC RBC AUTO-ENTMCNC: 34 G/DL (ref 31.5–36.5)
MCV RBC AUTO: 90 FL (ref 78–100)
MONOCYTES # BLD AUTO: 0.4 10E9/L (ref 0–1.3)
MONOCYTES NFR BLD AUTO: 8.1 %
NEUTROPHILS # BLD AUTO: 3 10E9/L (ref 1.6–8.3)
NEUTROPHILS NFR BLD AUTO: 63.3 %
PLATELET # BLD AUTO: 205 10E9/L (ref 150–450)
POTASSIUM SERPL-SCNC: 4.7 MMOL/L (ref 3.4–5.3)
PROT SERPL-MCNC: 7.4 G/DL (ref 6.8–8.8)
RBC # BLD AUTO: 4.25 10E12/L (ref 3.8–5.2)
SODIUM SERPL-SCNC: 138 MMOL/L (ref 133–144)
TSH SERPL DL<=0.005 MIU/L-ACNC: 1.03 MU/L (ref 0.4–4)
WBC # BLD AUTO: 4.7 10E9/L (ref 4–11)

## 2018-09-11 PROCEDURE — 99396 PREV VISIT EST AGE 40-64: CPT | Performed by: FAMILY MEDICINE

## 2018-09-11 PROCEDURE — 84443 ASSAY THYROID STIM HORMONE: CPT | Performed by: FAMILY MEDICINE

## 2018-09-11 PROCEDURE — 80076 HEPATIC FUNCTION PANEL: CPT | Performed by: FAMILY MEDICINE

## 2018-09-11 PROCEDURE — 85025 COMPLETE CBC W/AUTO DIFF WBC: CPT | Performed by: FAMILY MEDICINE

## 2018-09-11 PROCEDURE — 80048 BASIC METABOLIC PNL TOTAL CA: CPT | Performed by: FAMILY MEDICINE

## 2018-09-11 PROCEDURE — 36415 COLL VENOUS BLD VENIPUNCTURE: CPT | Performed by: FAMILY MEDICINE

## 2018-09-11 RX ORDER — CITALOPRAM HYDROBROMIDE 20 MG/1
20 TABLET ORAL DAILY
Qty: 30 TABLET | Refills: 0 | Status: SHIPPED | OUTPATIENT
Start: 2018-09-11 | End: 2018-12-11

## 2018-09-11 RX ORDER — TRAZODONE HYDROCHLORIDE 50 MG/1
TABLET, FILM COATED ORAL
Qty: 90 TABLET | Refills: 3 | Status: SHIPPED | OUTPATIENT
Start: 2018-09-11 | End: 2018-12-20

## 2018-09-11 RX ORDER — CHOLESTYRAMINE LIGHT 4 G/5.7G
POWDER, FOR SUSPENSION ORAL
Qty: 239.4 G | Refills: 11 | Status: SHIPPED | OUTPATIENT
Start: 2018-09-11 | End: 2018-12-20

## 2018-09-11 RX ORDER — CHOLESTYRAMINE LIGHT 4 G/5.7G
POWDER, FOR SUSPENSION ORAL
Qty: 239.4 G | Refills: 11 | Status: CANCELLED | OUTPATIENT
Start: 2018-09-11

## 2018-09-11 ASSESSMENT — PATIENT HEALTH QUESTIONNAIRE - PHQ9
SUM OF ALL RESPONSES TO PHQ QUESTIONS 1-9: 1
SUM OF ALL RESPONSES TO PHQ QUESTIONS 1-9: 1
10. IF YOU CHECKED OFF ANY PROBLEMS, HOW DIFFICULT HAVE THESE PROBLEMS MADE IT FOR YOU TO DO YOUR WORK, TAKE CARE OF THINGS AT HOME, OR GET ALONG WITH OTHER PEOPLE: NOT DIFFICULT AT ALL

## 2018-09-11 ASSESSMENT — ANXIETY QUESTIONNAIRES
7. FEELING AFRAID AS IF SOMETHING AWFUL MIGHT HAPPEN: NOT AT ALL
6. BECOMING EASILY ANNOYED OR IRRITABLE: NOT AT ALL
GAD7 TOTAL SCORE: 0
GAD7 TOTAL SCORE: 0
3. WORRYING TOO MUCH ABOUT DIFFERENT THINGS: NOT AT ALL
2. NOT BEING ABLE TO STOP OR CONTROL WORRYING: NOT AT ALL
7. FEELING AFRAID AS IF SOMETHING AWFUL MIGHT HAPPEN: NOT AT ALL
4. TROUBLE RELAXING: NOT AT ALL
5. BEING SO RESTLESS THAT IT IS HARD TO SIT STILL: NOT AT ALL
1. FEELING NERVOUS, ANXIOUS, OR ON EDGE: NOT AT ALL
GAD7 TOTAL SCORE: 0

## 2018-09-11 NOTE — PROGRESS NOTES
SUBJECTIVE:   CC: Jenny Banks is an 62 year old woman who presents for preventive health visit.     Answers for HPI/ROS submitted by the patient on 9/11/2018   Annual Exam:  Getting at least 3 servings of Calcium per day:: Yes  Bi-annual eye exam:: Yes  Dental care twice a year:: Yes  Sleep apnea or symptoms of sleep apnea:: Excessive snoring  Diet:: Gluten-free/reduced  Frequency of exercise:: None  Taking medications regularly:: Yes  Medication side effects:: Not applicable  Additional concerns today:: No  PHQ-2 Score: 0  If you checked off any problems, how difficult have these problems made it for you to do your work, take care of things at home, or get along with other people?: Not difficult at all  PHQ9 TOTAL SCORE: 1  LUCIANA 7 TOTAL SCORE: 0          Today's PHQ-2 Score:   PHQ-2 ( 1999 Pfizer) 9/11/2018 1/8/2016   Q1: Little interest or pleasure in doing things 0 0   Q2: Feeling down, depressed or hopeless 0 0   PHQ-2 Score 0 0   Q1: Little interest or pleasure in doing things Not at all -   Q2: Feeling down, depressed or hopeless Not at all -   PHQ-2 Score 0 -       Abuse: Current or Past(Physical, Sexual or Emotional)- No  Do you feel safe in your environment - Yes    Social History   Substance Use Topics     Smoking status: Former Smoker     Types: Cigarettes     Quit date: 5/10/1994     Smokeless tobacco: Never Used      Comment: quit 1995     Alcohol use No     If you drink alcohol do you typically have >3 drinks per day or >7 drinks per week? No                     Reviewed orders with patient.  Reviewed health maintenance and updated orders accordingly - Yes  Labs reviewed in [facility]    Patient over age 50, mutual decision to screen reflected in health maintenance.    Pertinent mammograms are reviewed under the imaging tab.  History of abnormal Pap smear: NO - age 30- 65 PAP every 3 years recommended  PAP / HPV 12/13/2007 4/4/2006   PAP NIL NIL     Reviewed and updated as needed this visit by  "clinical staff  Tobacco  Allergies  Meds  Problems  Med Hx  Surg Hx  Fam Hx  Soc Hx          Reviewed and updated as needed this visit by Provider  Allergies  Meds  Problems            ROS:  CONSTITUTIONAL: NEGATIVE for fever, chills, change in weight  INTEGUMENTARY/SKIN: NEGATIVE for worrisome rashes, moles or lesions  EYES: NEGATIVE for vision changes or irritation  ENT: NEGATIVE for ear, mouth and throat problems  RESP: NEGATIVE for significant cough or SOB  BREAST: NEGATIVE for masses, tenderness or discharge  CV: NEGATIVE for chest pain, palpitations or peripheral edema  GI: NEGATIVE for nausea, abdominal pain, heartburn, or change in bowel habits  : NEGATIVE for unusual urinary or vaginal symptoms. No vaginal bleeding.  MUSCULOSKELETAL: NEGATIVE for significant arthralgias or myalgia  NEURO: NEGATIVE for weakness, dizziness or paresthesias  PSYCHIATRIC: NEGATIVE for changes in mood or affect     OBJECTIVE:   /74 (BP Location: Right arm, Patient Position: Sitting, Cuff Size: Adult Regular)  Pulse 60  Temp 97.4  F (36.3  C) (Tympanic)  Resp 16  Ht 5' 3\" (1.6 m)  Wt 153 lb (69.4 kg)  LMP 06/14/2006  BMI 27.1 kg/m2  EXAM:  GENERAL: healthy, alert and no distress  EYES: Eyes grossly normal to inspection, PERRL and conjunctivae and sclerae normal  HENT: ear canals and TM's normal, nose and mouth without ulcers or lesions  NECK: no adenopathy, no asymmetry, masses, or scars and thyroid normal to palpation  RESP: lungs clear to auscultation - no rales, rhonchi or wheezes  BREAST: normal without masses, tenderness or nipple discharge and no palpable axillary masses or adenopathy  CV: regular rate and rhythm, normal S1 S2, no S3 or S4, no murmur, click or rub, no peripheral edema and peripheral pulses strong  ABDOMEN: soft, nontender, no hepatosplenomegaly, no masses and bowel sounds normal  MS: no gross musculoskeletal defects noted, no edema  SKIN: no suspicious lesions or rashes  NEURO: " "Normal strength and tone, mentation intact and speech normal  PSYCH: mentation appears normal, affect normal/bright    Diagnostic Test Results:  none     ASSESSMENT/PLAN:   1. Encounter for well adult exam without abnormal findings      2. Major depressive disorder, recurrent episode, mild (H)  Stable no change in treatment plan.   - citalopram (CELEXA) 20 MG tablet; Take 1 tablet (20 mg) by mouth daily No further refills until seen in clinic  Dispense: 30 tablet; Refill: 0  - traZODone (DESYREL) 50 MG tablet; TAKE ONE TABLET BY MOUTH NIGHTLY AS NEEDED FOR SLEEP  Dispense: 90 tablet; Refill: 3  - TSH with free T4 reflex    3. Crohn's disease without complication, unspecified gastrointestinal tract location (H)  Stable no change in treatment plan.   - cholestyramine light (PREVALITE) 4 GM powder; MIX ONE SCOOP (4 GRAMS) AND DRINK ONCE DAILY AS DIRECTED  Dispense: 239.4 g; Refill: 11  - Hepatic panel  - Basic metabolic panel  - CBC with platelets differential    4. Special screening for malignant neoplasms, colon    - GASTROENTEROLOGY ADULT REF PROCEDURE ONLY    5. Encounter for screening mammogram for breast cancer    - *MA Screening Digital Bilateral; Future    COUNSELING:   Reviewed preventive health counseling, as reflected in patient instructions    BP Readings from Last 1 Encounters:   09/11/18 120/74     Estimated body mass index is 27.1 kg/(m^2) as calculated from the following:    Height as of this encounter: 5' 3\" (1.6 m).    Weight as of this encounter: 153 lb (69.4 kg).      Weight management plan: Discussed healthy diet and exercise guidelines and patient will follow up in 12 months in clinic to re-evaluate.     reports that she quit smoking about 24 years ago. Her smoking use included Cigarettes. She has never used smokeless tobacco.      Counseling Resources:  ATP IV Guidelines  Pooled Cohorts Equation Calculator  Breast Cancer Risk Calculator  FRAX Risk Assessment  ICSI Preventive Guidelines  Dietary " Guidelines for Americans, 2010  USDA's MyPlate  ASA Prophylaxis  Lung CA Screening    Yaima Aguilar MD  [facility]

## 2018-09-11 NOTE — NURSING NOTE
"Chief Complaint   Patient presents with     Physical       Initial /74 (BP Location: Right arm, Patient Position: Sitting, Cuff Size: Adult Regular)  Pulse 60  Temp 97.4  F (36.3  C) (Tympanic)  Resp 16  Ht 5' 3\" (1.6 m)  Wt 153 lb (69.4 kg)  LMP 06/14/2006  BMI 27.1 kg/m2 Estimated body mass index is 27.1 kg/(m^2) as calculated from the following:    Height as of this encounter: 5' 3\" (1.6 m).    Weight as of this encounter: 153 lb (69.4 kg).      Health Maintenance that is potentially due pending provider review:  Mammogram and Colonoscopy/FIT    Pt will schedule mammogram colonscopy appt.    Is there anyone who you would like to be able to receive your results? No  If yes have patient fill out LIBERTAD    Lesli Mina MA Student    "

## 2018-09-11 NOTE — MR AVS SNAPSHOT
After Visit Summary   9/11/2018    Jenny Banks    MRN: [id number]           Patient Information     Date Of Birth          1955        Visit Information        Provider Department      9/11/2018 12:40 PM Yaima Aguilar MD [facility]        Today's Diagnoses     Encounter for well adult exam without abnormal findings    -  1    Major depressive disorder, recurrent episode, mild (H)        Crohn's disease without complication, unspecified gastrointestinal tract location (H)        Special screening for malignant neoplasms, colon        Encounter for screening mammogram for breast cancer          Care Instructions      Preventive Health Recommendations  Female Ages 50 - 64    Yearly exam: See your health care provider every year in order to  o Review health changes.   o Discuss preventive care.    o Review your medicines if your doctor has prescribed any.      Get a Pap test every three years (unless you have an abnormal result and your provider advises testing more often).    If you get Pap tests with HPV test, you only need to test every 5 years, unless you have an abnormal result.     You do not need a Pap test if your uterus was removed (hysterectomy) and you have not had cancer.    You should be tested each year for STDs (sexually transmitted diseases) if you're at risk.     Have a mammogram every 1 to 2 years.    Have a colonoscopy at age 50, or have a yearly FIT test (stool test). These exams screen for colon cancer.      Have a cholesterol test every 5 years, or more often if advised.    Have a diabetes test (fasting glucose) every three years. If you are at risk for diabetes, you should have this test more often.     If you are at risk for osteoporosis (brittle bone disease), think about having a bone density scan (DEXA).    Shots: Get a flu shot each year. Get a tetanus shot every 10 years.    Nutrition:     Eat at least 5 servings of fruits and vegetables  each day.    Eat whole-grain bread, whole-wheat pasta and brown rice instead of white grains and rice.    Get adequate Calcium and Vitamin D.     Lifestyle    Exercise at least 150 minutes a week (30 minutes a day, 5 days a week). This will help you control your weight and prevent disease.    Limit alcohol to one drink per day.    No smoking.     Wear sunscreen to prevent skin cancer.     See your dentist every six months for an exam and cleaning.    See your eye doctor every 1 to 2 years.      Labs today     Set up mammogram   Set up colonoscopy           Follow-ups after your visit        Additional Services     GASTROENTEROLOGY ADULT REF PROCEDURE ONLY       Last Lab Result: Creatinine (mg/dL)       Date                     Value                 01/08/2016               0.73             ----------  Body mass index is 27.1 kg/(m^2).     Needed:  No  Language:  English    Patient will be contacted to schedule procedure.     Please be aware that coverage of these services is subject to the terms and limitations of your health insurance plan.  Call member services at your health plan with any benefit or coverage questions.  Any procedures must be performed at a [facility] facility OR coordinated by your clinic's referral office.    Please bring the following with you to your appointment:    (1) Any X-Rays, CTs or MRIs which have been performed.  Contact the facility where they were done to arrange for  prior to your scheduled appointment.    (2) List of current medications   (3) This referral request   (4) Any documents/labs given to you for this referral                  Follow-up notes from your care team     Return in about 1 year (around 9/11/2019) for Physical Exam.      Your next 10 appointments already scheduled     Oct 01, 2018 10:30 AM CDT   MA SCREENING DIGITAL BILATERAL with [facility]   [facility]    [address] [place] [address]  "[place] [id number]   [phone]           Do not use any powder, lotion or deodorant under your arms or on your breast. If you do, we will ask you to remove it before your exam.  Wear comfortable, two-piece clothing.  If you have any allergies, tell your care team.  Bring any previous mammograms from other facilities or have them mailed to the breast center.              Who to contact     If you have questions or need follow up information about today's clinic visit or your schedule please contact [facility] directly at [phone].  Normal or non-critical lab and imaging results will be communicated to you by [employer], letter or phone within 4 business days after the clinic has received the results. If you do not hear from us within 7 days, please contact the clinic through [email or web address] or phone. If you have a critical or abnormal lab result, we will notify you by phone as soon as possible.  Submit refill requests through [email or web address] or call your pharmacy and they will forward the refill request to us. Please allow 3 business days for your refill to be completed.          Additional Information About Your Visit        MyChart Information     [email or web address] gives you secure access to your electronic health record. If you see a primary care provider, you can also send messages to your care team and make appointments. If you have questions, please call your primary care clinic.  If you do not have a primary care provider, please call [phone] and they will assist you.        Care EveryWhere ID     This is your Care EveryWhere ID. This could be used by other organizations to access your [place] medical records  [id number]        Your Vitals Were     Pulse Temperature Respirations Height Last Period BMI (Body Mass Index)    60 97.4  F (36.3  C) (Tympanic) 16 5' 3\" (1.6 m) 06/14/2006 27.1 kg/m2       Blood Pressure from Last 3 Encounters:   09/11/18 120/74   05/08/17 112/66   01/08/16 123/74    Weight from Last 3 " Encounters:   09/11/18 153 lb (69.4 kg)   05/08/17 148 lb 9.6 oz (67.4 kg)   01/08/16 146 lb 6.4 oz (66.4 kg)              We Performed the Following     Basic metabolic panel     CBC with platelets differential     GASTROENTEROLOGY ADULT REF PROCEDURE ONLY     Hepatic panel     TSH with free T4 reflex          Where to get your medicines      These medications were sent to [facility] [place] [id number]     Phone:  [phone]     cholestyramine light 4 GM powder    citalopram 20 MG tablet    traZODone 50 MG tablet          Primary Care Provider Office Phone # Fax #    Yaima Aguilar -[phone]       [address] [place] [address]        Equal Access to Services     MEHNAZ MANN : Khoa Love, waaxda luqadaha, qaybta kaalmada federica, alessandro simons . So [facility] [phone].    ATENCIÓN: Si habla español, tiene a francis disposición servicios gratuitos de asistencia lingüística. Llame al [phone].    We comply with applicable federal civil rights laws and [place] laws. We do not discriminate on the basis of race, color, national origin, age, disability, sex, sexual orientation, or gender identity.            Thank you!     Thank you for choosing [facility]  for your care. Our goal is always to provide you with excellent care. Hearing back from our patients is one way we can continue to improve our services. Please take a few minutes to complete the written survey that you may receive in the mail after your visit with us. Thank you!             Your Updated Medication List - Protect others around you: Learn how to safely use, store and throw away your medicines at www.disposemymeds.org.          This list is accurate as of 9/11/18 11:59 PM.  Always use your most recent med list.                   Brand Name Dispense Instructions for use  Diagnosis    Biotin 10 MG Tabs tablet      Take 10,000 mcg by mouth 2 times daily        cholestyramine light 4 GM powder    PREVALITE    239.4 g    MIX ONE SCOOP (4 GRAMS) AND DRINK ONCE DAILY AS DIRECTED    Crohn's disease without complication, unspecified gastrointestinal tract location (H)       citalopram 20 MG tablet    celeXA    30 tablet    Take 1 tablet (20 mg) by mouth daily No further refills until seen in clinic    Major depressive disorder, recurrent episode, mild (H)       CLARITIN-D 12 HOUR PO      Take  by mouth.        estradiol 0.05 MG/24HR BIW patch    VIVELLE-DOT    8 patch    Place 1 patch onto the skin twice a week Due for annual    Menopause       MULTIVITAMIN PO      one daily        traZODone 50 MG tablet    DESYREL    90 tablet    TAKE ONE TABLET BY MOUTH NIGHTLY AS NEEDED FOR SLEEP    Major depressive disorder, recurrent episode, mild (H)

## 2018-09-11 NOTE — PATIENT INSTRUCTIONS

## 2018-09-12 ASSESSMENT — PATIENT HEALTH QUESTIONNAIRE - PHQ9: SUM OF ALL RESPONSES TO PHQ QUESTIONS 1-9: 1

## 2018-09-12 ASSESSMENT — ANXIETY QUESTIONNAIRES: GAD7 TOTAL SCORE: 0

## 2018-10-17 ENCOUNTER — RADIANT APPOINTMENT (OUTPATIENT)
Dept: MAMMOGRAPHY | Facility: CLINIC | Age: 63
End: 2018-10-17
Attending: FAMILY MEDICINE
Payer: COMMERCIAL

## 2018-10-17 DIAGNOSIS — Z12.31 ENCOUNTER FOR SCREENING MAMMOGRAM FOR BREAST CANCER: ICD-10-CM

## 2018-10-17 PROCEDURE — 77067 SCR MAMMO BI INCL CAD: CPT | Mod: TC

## 2018-10-23 ENCOUNTER — TELEPHONE (OUTPATIENT)
Dept: GENERAL RADIOLOGY | Facility: CLINIC | Age: 63
End: 2018-10-23

## 2018-10-23 NOTE — TELEPHONE ENCOUNTER
"Patient Communication Preferences indicates \"DO not contact\" and/or communication by \"Phone\" is not preferred.  Call not required per Outreach team.  "

## 2018-12-11 DIAGNOSIS — F33.0 MAJOR DEPRESSIVE DISORDER, RECURRENT EPISODE, MILD (H): ICD-10-CM

## 2018-12-11 RX ORDER — CITALOPRAM HYDROBROMIDE 20 MG/1
20 TABLET ORAL DAILY
Qty: 90 TABLET | Refills: 1 | Status: SHIPPED | OUTPATIENT
Start: 2018-12-11 | End: 2018-12-20

## 2018-12-11 NOTE — TELEPHONE ENCOUNTER
Requested Prescriptions   Pending Prescriptions Disp Refills     citalopram (CELEXA) 20 MG tablet 30 tablet 0     Sig: Take 1 tablet (20 mg) by mouth daily No further refills until seen in clinic    There is no refill protocol information for this order        Last Written Prescription Date:  9/11/18  Last Fill Quantity: 30,  # refills: 0   Last office visit: 9/11/2018 with prescribing provider:      Future Office Visit:

## 2018-12-20 DIAGNOSIS — K50.90 CROHN'S DISEASE WITHOUT COMPLICATION, UNSPECIFIED GASTROINTESTINAL TRACT LOCATION (H): ICD-10-CM

## 2018-12-20 DIAGNOSIS — F33.0 MAJOR DEPRESSIVE DISORDER, RECURRENT EPISODE, MILD (H): ICD-10-CM

## 2018-12-20 RX ORDER — CHOLESTYRAMINE LIGHT 4 G/5.7G
POWDER, FOR SUSPENSION ORAL
Qty: 239.4 G | Refills: 11 | Status: SHIPPED | OUTPATIENT
Start: 2018-12-20 | End: 2019-10-24

## 2018-12-20 RX ORDER — CITALOPRAM HYDROBROMIDE 20 MG/1
20 TABLET ORAL DAILY
Qty: 90 TABLET | Refills: 1 | Status: SHIPPED | OUTPATIENT
Start: 2018-12-20 | End: 2019-09-24

## 2018-12-20 RX ORDER — TRAZODONE HYDROCHLORIDE 50 MG/1
TABLET, FILM COATED ORAL
Qty: 90 TABLET | Refills: 3 | Status: SHIPPED | OUTPATIENT
Start: 2018-12-20 | End: 2019-09-24

## 2018-12-20 NOTE — TELEPHONE ENCOUNTER
"Pt thought meds had all been refilled at appointment in September. She is out of the cholestryramine light. Please sent through today.    Requested Prescriptions   Pending Prescriptions Disp Refills     citalopram (CELEXA) 20 MG tablet 90 tablet 1     Sig: Take 1 tablet (20 mg) by mouth daily No further refills until seen in clinic    SSRIs Protocol Passed - 12/20/2018  8:14 AM       Passed - PHQ-9 score less than 5 in past 6 months    Please review last PHQ-9 score.          Passed - Patient is age 18 or older       Passed - No active pregnancy on record       Passed - No positive pregnancy test in last 12 months       Passed - Recent (6 mo) or future (30 days) visit within the authorizing provider's specialty    Patient had office visit in the last 6 months or has a visit in the next 30 days with authorizing provider or within the authorizing provider's specialty.  See \"Patient Info\" tab in inbasket, or \"Choose Columns\" in Meds & Orders section of the refill encounter.       .  Last Written Prescription Date:  12/11/18  Last Fill Quantity: 90,  # refills: 1  Last office visit: 9/11/2018 with prescribing provider:  Dr Aguilar  Future Office Visit:       traZODone (DESYREL) 50 MG tablet 90 tablet 3     Sig: TAKE ONE TABLET BY MOUTH NIGHTLY AS NEEDED FOR SLEEP    Serotonin Modulators Passed - 12/20/2018  8:14 AM       Passed - Recent (12 mo) or future (30 days) visit within the authorizing provider's specialty    Patient had office visit in the last 12 months or has a visit in the next 30 days with authorizing provider or within the authorizing provider's specialty.  See \"Patient Info\" tab in inbasket, or \"Choose Columns\" in Meds & Orders section of the refill encounter.             Passed - Patient is age 18 or older       Passed - No active pregnancy on record       Passed - No positive pregnancy test in past 12 months        cholestyramine light (PREVALITE) 4 GM powder 239.4 g 11     Sig: MIX ONE SCOOP (4 GRAMS) " AND DRINK ONCE DAILY AS DIRECTED    There is no refill protocol information for this order      Last Written Prescription Date:  9/11/18  Last Fill Quantity: 90,  # refills: 3   Last office visit: 9/11/2018 with prescribing provider:  Dr Aguilar  Future Office Visit:

## 2019-01-11 ENCOUNTER — ANCILLARY PROCEDURE (OUTPATIENT)
Dept: GENERAL RADIOLOGY | Facility: CLINIC | Age: 64
End: 2019-01-11
Payer: COMMERCIAL

## 2019-01-11 ENCOUNTER — OFFICE VISIT (OUTPATIENT)
Dept: ORTHOPEDICS | Facility: CLINIC | Age: 64
End: 2019-01-11
Payer: COMMERCIAL

## 2019-01-11 VITALS
BODY MASS INDEX: 27.64 KG/M2 | WEIGHT: 156 LBS | DIASTOLIC BLOOD PRESSURE: 75 MMHG | HEIGHT: 63 IN | SYSTOLIC BLOOD PRESSURE: 112 MMHG

## 2019-01-11 DIAGNOSIS — M18.0 PRIMARY OSTEOARTHRITIS OF BOTH FIRST CARPOMETACARPAL JOINTS: Primary | ICD-10-CM

## 2019-01-11 DIAGNOSIS — M79.642 BILATERAL HAND PAIN: ICD-10-CM

## 2019-01-11 DIAGNOSIS — M79.641 BILATERAL HAND PAIN: ICD-10-CM

## 2019-01-11 PROCEDURE — 73130 X-RAY EXAM OF HAND: CPT | Mod: LT

## 2019-01-11 PROCEDURE — 99203 OFFICE O/P NEW LOW 30 MIN: CPT | Performed by: PEDIATRICS

## 2019-01-11 ASSESSMENT — MIFFLIN-ST. JEOR: SCORE: 1241.74

## 2019-01-11 NOTE — LETTER
1/11/2019         RE: Jenny Banks  [id number] [facility] [id number]        Dear Colleague,    Thank you for referring your patient, Jenny Banks, to the [facility]. Please see a copy of my visit note below.    Sports Medicine Clinic Visit    PCP: Yaima Aguilar    Jenny Banks is a 63 year old female who is seen  as a self referral presenting with bilateral hand pain    Injury: She reports bilateral hand pain for 2 years. She reports pain is located in her radial palm and wrist. She reports she initially had numbness in her hands but that has improved. She reports pain and weakness with gripping activities. Her right hand is worse then her left hand. She is left hand dominate.    Location of Pain: bilateral hand pain  Duration of Pain: 2 year(s)  Rating of Pain at worst: 9/10  Rating of Pain Currently: 5/10  Symptoms are better with: Ice, Tylenol and Rest  Symptoms are worse with: extension, flexion and gripping  Additional Features:   Positive: swelling, paresthesias, numbness and weakness   Negative: bruising, popping, grinding, catching, locking and instability  Other evaluation and/or treatments so far consists of: wrist and thumb bracing.   Prior History of related problems: nothing    Social History: Counseling therapist    Review of Systems  Skin: no bruising, no swelling  Musculoskeletal: as above  Neurologic: occasional numbness, paresthesias  Remainder of review of systems is negative including constitutional, CV, pulmonary, GI, except as noted in HPI or medical history.    Patient's current problem list, past medical and surgical history, and family history were reviewed.    Patient Active Problem List   Diagnosis     Regional enteritis (H)     Anemia     Cellulitis and abscess     Menopause     CARDIOVASCULAR SCREENING; LDL GOAL LESS THAN 160     Health Care Home     Mild major depression (H)     Advanced directives, counseling/discussion  "    Perioral dermatitis     Bilateral carpal tunnel syndrome     Major depressive disorder, recurrent episode, mild (H)     Past Medical History:   Diagnosis Date     Allergic rhinitis, cause unspecified      Excessive or frequent menstruation      Other vitamin B12 deficiency anemia      Regional enteritis of unspecified site     Crohn's disease     Past Surgical History:   Procedure Laterality Date     C LAPAROSCOPY, SURGICAL; W/ VAGINAL HYSTERECTOMY W/WO REMOVAL OVARY(S)/TUBES  6/2006    LAVH with LSO     C PART REMOVAL COLON W ANASTOMOSIS  1991     COLONOSCOPY       HYSTERECTOMY, PAP NO LONGER INDICATED  2006    menorrhagia     TUBAL LIGATION       Family History   Problem Relation Age of Onset     Cancer Paternal Grandmother         unknown     Diabetes Brother      Gastrointestinal Disease Daughter         Crohn's disease     Genitourinary Problems Daughter         kidney stones     Depression Daughter      Alcohol/Drug Daughter         prescription drug abuse     Gastrointestinal Disease Brother         Crohn's disease     Depression Son      Alzheimer Disease Maternal Grandmother      Depression Mother      Heart Disease Mother         MI     Depression Sister          Objective  /75 (BP Location: Left arm, Patient Position: Chair, Cuff Size: Adult Regular)   Ht 1.6 m (5' 3\")   Wt 70.8 kg (156 lb)   LMP 06/14/2006   BMI 27.63 kg/m       GENERAL APPEARANCE: healthy, alert and no distress   GAIT: NORMAL  SKIN: no suspicious lesions or rashes  HEENT: Sclera clear, anicteric  CV: good peripheral pulses  RESP: Breathing not labored  NEURO: Normal strength and tone, mentation intact and speech normal  PSYCH:  mentation appears normal and affect normal/bright    Bilateral Wrist and Hand exam    Inspection:       No swelling, bruising or deformity bilateral    Tender:       Base of thumbs bilaterally, CMC joints    Non Tender:       Remainder of the Wrist and Hand bilateral    ROM:       Full and symmetric " active and passive range of motion of the forearm, wrist and digits bilateral    Strength:       5/5 strength in the muscles of the hand, wrist and forearm bilateral    Special Tests:        positive (+) Tinel's test left and       neg (-) Phalen's test bilateral    Neurovascular:       2+ radial pulses bilaterally with brisk capillary refill and      normal sensation to light touch in the radial, median and ulnar nerve distributions      Radiology  I ordered, visualized and reviewed these images with the patient  HAND BILATERAL THREE OR MORE VIEWS 1/11/2019 11:18 AM      HISTORY: Bilateral hand pain.     COMPARISON: None.                                                                      IMPRESSION:      Right hand: No acute bony or soft tissue abnormality. Moderate  degenerative change at the first carpometacarpal and triscaphe joints.  Early degenerative change at multiple interphalangeal joints.     Left hand: No acute bony or soft tissue abnormality. Early  degenerative change at the first carpometacarpal joint and triscaphe  joint. 0.9 x 0.7 cm benign cyst at the distal pole of the scaphoid  bone. Early degenerative change at some interphalangeal joints.    Assessment:  1. Primary osteoarthritis of both first carpometacarpal joints      We discussed the following treatment options: symptom treatment, activity modification/rest, injection, rehab and referral. Following discussion, plan: patient would like to consider injections and therapy including possible custom bracing.    Numbness and tingling likely carpal tunnel syndrome.  Discussed anatomy and pathophysiology of carpal tunnel. Discussed avoidance of exacerbating activities and use of braces.  We also discussed consideration of EMG pending symptoms.    Plan:  - Today's Plan of Care:  Rehab[facility] [phone]  Referral for US Guided steroid injections of bilateral CMC joints    -We also discussed other future  treatment options:  Referral to orthopedic hand surgeon or EMG of the bilateral upper extremity    Follow Up: as needed    Concerning signs and symptoms were reviewed.  The patient expressed understanding of this management plan and all questions were answered at this time.    Nichole Gutierrez MD [facility]  Primary Care Sports Medicine  [facility]    Again, thank you for allowing me to participate in the care of your patient.        Sincerely,        Nichole Gutierrez MD

## 2019-01-11 NOTE — PATIENT INSTRUCTIONS
Plan:  - Today's Plan of Care:  Rehab: Occupational Therapy: [facility] - [phone]  Referral for US Guided steroid injections of bilateral CMC joints    -We also discussed other future treatment options:  Referral to orthopedic hand surgeon or EMG of the bilateral upper extremity    Follow Up: as needed    If you have any further questions for your physician or physician s care team you can call [phone] and use option 3 to leave a voice message. Calls received during business hours will be returned same day.

## 2019-01-11 NOTE — PROGRESS NOTES
Sports Medicine Clinic Visit    PCP: Yaima Aguilar    Jenny Banks is a 63 year old female who is seen  as a self referral presenting with bilateral hand pain    Injury: She reports bilateral hand pain for 2 years. She reports pain is located in her radial palm and wrist. She reports she initially had numbness in her hands but that has improved. She reports pain and weakness with gripping activities. Her right hand is worse then her left hand. She is left hand dominate.    Location of Pain: bilateral hand pain  Duration of Pain: 2 year(s)  Rating of Pain at worst: 9/10  Rating of Pain Currently: 5/10  Symptoms are better with: Ice, Tylenol and Rest  Symptoms are worse with: extension, flexion and gripping  Additional Features:   Positive: swelling, paresthesias, numbness and weakness   Negative: bruising, popping, grinding, catching, locking and instability  Other evaluation and/or treatments so far consists of: wrist and thumb bracing.   Prior History of related problems: nothing    Social History: Counseling therapist    Review of Systems  Skin: no bruising, no swelling  Musculoskeletal: as above  Neurologic: occasional numbness, paresthesias  Remainder of review of systems is negative including constitutional, CV, pulmonary, GI, except as noted in HPI or medical history.    Patient's current problem list, past medical and surgical history, and family history were reviewed.    Patient Active Problem List   Diagnosis     Regional enteritis (H)     Anemia     Cellulitis and abscess     Menopause     CARDIOVASCULAR SCREENING; LDL GOAL LESS THAN 160     Health Care Home     Mild major depression (H)     Advanced directives, counseling/discussion     Perioral dermatitis     Bilateral carpal tunnel syndrome     Major depressive disorder, recurrent episode, mild (H)     Past Medical History:   Diagnosis Date     Allergic rhinitis, cause unspecified      Excessive or frequent menstruation      Other vitamin  "B12 deficiency anemia      Regional enteritis of unspecified site     Crohn's disease     Past Surgical History:   Procedure Laterality Date     C LAPAROSCOPY, SURGICAL; W/ VAGINAL HYSTERECTOMY W/WO REMOVAL OVARY(S)/TUBES  6/2006    LAVH with LSO     C PART REMOVAL COLON W ANASTOMOSIS  1991     COLONOSCOPY       HYSTERECTOMY, PAP NO LONGER INDICATED  2006    menorrhagia     TUBAL LIGATION       Family History   Problem Relation Age of Onset     Cancer Paternal Grandmother         unknown     Diabetes Brother      Gastrointestinal Disease Daughter         Crohn's disease     Genitourinary Problems Daughter         kidney stones     Depression Daughter      Alcohol/Drug Daughter         prescription drug abuse     Gastrointestinal Disease Brother         Crohn's disease     Depression Son      Alzheimer Disease Maternal Grandmother      Depression Mother      Heart Disease Mother         MI     Depression Sister          Objective  /75 (BP Location: Left arm, Patient Position: Chair, Cuff Size: Adult Regular)   Ht 1.6 m (5' 3\")   Wt 70.8 kg (156 lb)   LMP 06/14/2006   BMI 27.63 kg/m      GENERAL APPEARANCE: healthy, alert and no distress   GAIT: NORMAL  SKIN: no suspicious lesions or rashes  HEENT: Sclera clear, anicteric  CV: good peripheral pulses  RESP: Breathing not labored  NEURO: Normal strength and tone, mentation intact and speech normal  PSYCH:  mentation appears normal and affect normal/bright    Bilateral Wrist and Hand exam    Inspection:       No swelling, bruising or deformity bilateral    Tender:       Base of thumbs bilaterally, CMC joints    Non Tender:       Remainder of the Wrist and Hand bilateral    ROM:       Full and symmetric active and passive range of motion of the forearm, wrist and digits bilateral    Strength:       5/5 strength in the muscles of the hand, wrist and forearm bilateral    Special Tests:        positive (+) Tinel's test left and       neg (-) Phalen's test " bilateral    Neurovascular:       2+ radial pulses bilaterally with brisk capillary refill and      normal sensation to light touch in the radial, median and ulnar nerve distributions      Radiology  I ordered, visualized and reviewed these images with the patient  HAND BILATERAL THREE OR MORE VIEWS 1/11/2019 11:18 AM      HISTORY: Bilateral hand pain.     COMPARISON: None.                                                                      IMPRESSION:      Right hand: No acute bony or soft tissue abnormality. Moderate  degenerative change at the first carpometacarpal and triscaphe joints.  Early degenerative change at multiple interphalangeal joints.     Left hand: No acute bony or soft tissue abnormality. Early  degenerative change at the first carpometacarpal joint and triscaphe  joint. 0.9 x 0.7 cm benign cyst at the distal pole of the scaphoid  bone. Early degenerative change at some interphalangeal joints.    Assessment:  1. Primary osteoarthritis of both first carpometacarpal joints      We discussed the following treatment options: symptom treatment, activity modification/rest, injection, rehab and referral. Following discussion, plan: patient would like to consider injections and therapy including possible custom bracing.    Numbness and tingling likely carpal tunnel syndrome.  Discussed anatomy and pathophysiology of carpal tunnel. Discussed avoidance of exacerbating activities and use of braces.  We also discussed consideration of EMG pending symptoms.    Plan:  - Today's Plan of Care:  Rehab: Occupational Therapy: [facility] - [phone]  Referral for US Guided steroid injections of bilateral CMC joints    -We also discussed other future treatment options:  Referral to orthopedic hand surgeon or EMG of the bilateral upper extremity    Follow Up: as needed    Concerning signs and symptoms were reviewed.  The patient expressed understanding of this management plan and all questions were  answered at this time.    Nichole Gutierrez MD [facility]  Primary Care Sports Medicine  [facility]

## 2019-01-16 ENCOUNTER — MEDICAL CORRESPONDENCE (OUTPATIENT)
Dept: HEALTH INFORMATION MANAGEMENT | Facility: CLINIC | Age: 64
End: 2019-01-16

## 2019-01-22 ENCOUNTER — TRANSFERRED RECORDS (OUTPATIENT)
Dept: HEALTH INFORMATION MANAGEMENT | Facility: CLINIC | Age: 64
End: 2019-01-22

## 2019-02-12 ENCOUNTER — TRANSFERRED RECORDS (OUTPATIENT)
Dept: HEALTH INFORMATION MANAGEMENT | Facility: CLINIC | Age: 64
End: 2019-02-12

## 2019-03-04 ENCOUNTER — OFFICE VISIT (OUTPATIENT)
Dept: ORTHOPEDICS | Facility: CLINIC | Age: 64
End: 2019-03-04
Payer: COMMERCIAL

## 2019-03-04 VITALS — BODY MASS INDEX: 27.63 KG/M2 | HEIGHT: 63 IN

## 2019-03-04 DIAGNOSIS — M18.12 ARTHRITIS OF CARPOMETACARPAL (CMC) JOINT OF LEFT THUMB: Primary | ICD-10-CM

## 2019-03-04 PROCEDURE — 20604 DRAIN/INJ JOINT/BURSA W/US: CPT | Mod: LT | Performed by: FAMILY MEDICINE

## 2019-03-04 RX ADMIN — BETAMETHASONE SODIUM PHOSPHATE AND BETAMETHASONE ACETATE 6 MG: 3; 3 INJECTION, SUSPENSION INTRA-ARTICULAR; INTRALESIONAL; INTRAMUSCULAR; SOFT TISSUE at 10:20

## 2019-03-04 RX ADMIN — ROPIVACAINE HYDROCHLORIDE 0.5 ML: 5 INJECTION, SOLUTION EPIDURAL; INFILTRATION; PERINEURAL at 10:20

## 2019-03-04 NOTE — LETTER
3/4/2019         RE: Jenny Banks  [id number]   [facility] [address]        Dear Colleague,    Thank you for referring your patient, Jenny Banks, to the [facility]. Please see a copy of my visit note below.    Small Joint Injection/Arthrocentesis: L thumb MCP  Date/Time: 3/4/2019 10:20 AM  Performed by: Mc Marshall MD  Authorized by: Mc Marshall MD     Indications:  Pain  Needle Size:  27 G  Guidance: ultrasound    Approach:  Radial  Location:  Thumb  Site:  L thumb MCP  Medications:  6 mg betamethasone acet & sod phos 6 (3-3) MG/ML; 0.5 mL ropivacaine 5 MG/ML  Medications comment:  Actual amount of celestone used 0.5 mL  Outcome:  Tolerated well, no immediate complications  Procedure discussed: discussed risks, benefits, and alternatives    Consent Given by:  Patient  Timeout: timeout called immediately prior to procedure    Prep: patient was prepped and draped in usual sterile fashion     Patient had significant amount of pain with injection secondary to fluid going into the CMC joint.  Pain is significant improved 5-10 minutes postinjection and improvement in range of motion as well as strength compared to when she came in.  Given the significant pain that she was in for the left side patient deferred the right CMC injection given she reports it is not as painful as the left.  We will have her continue to monitor symptoms, after cares given.  She can follow-up as needed for repeat injections in 3 months or sooner for the right side.  Patient understands and agrees with plan.    Mc Marshall                Again, thank you for allowing me to participate in the care of your patient.        Sincerely,        Mc Marshall MD

## 2019-03-04 NOTE — PATIENT INSTRUCTIONS
Patient Education     Understanding Carpometacarpal Osteoarthritis    The base of the thumb where it meets the hand is called the carpometacarpal (CMC) joint. This joint allows the thumb to move freely in many directions. It also provides strength so the hand can grasp and .  A smooth tissue called cartilage lines and cushions the bones of the CMC joint. Using the thumb puts stress on the joint. Over time, this can lead to the breakdown of the cartilage in the joint. This is known as osteoarthritis. With this condition, bones of the joint may be exposed and rub together. They may become irritated and rough. This keeps the joint from moving smoothly and can lead to pain.     How to say it  [id number]      What causes CMC joint osteoarthritis?    This type of osteoarthritis is mainly caused by years of using the hand and thumb. The condition may be more likely if you:    Regularly do things that put great stress on the thumb joint    Have had previous thumb injuries    Have weakened or loose structures in the thumb    Are a woman who is past menopause  Symptoms of CMC joint osteoarthritis  Symptoms commonly include:    Thumb pain. It may get worse with pinching or gripping.    Thumb weakness    Sounds of grinding or popping in the thumb joint    Base of the thumb is enlarged   Treatment for CMC joint osteoarthritis  Osteoarthritis is a long-term (chronic) condition. Treatment focuses on managing symptoms. It may include:    Taking prescription or over-the-counter pain medicines to help reduce pain and swelling    Using a brace to rest and support the thumb joint    Using hot or cold therapy to help relieve pain    Learning and practicing ways to reduce stress on the thumb joint    Using devices that help protect the joint. These include jar openers, pen , and spring-action scissors.    Following a plan of physical therapy and exercises. This will help improve the flexibility and strength of the  hand and thumb.    Getting shots of medicine into the joint to help relieve symptoms for a time  If these treatments don t do enough to relieve severe pain, you may need surgery. There are several different types of surgery. In general, the goal is to relieve pain and help you to be able to use the hand.     When to call your healthcare provider  Call your healthcare provider right away if you have any of these:    Fever of 100.4 F (38 C) or higher, or as directed    Symptoms that don t get better, or get worse    New symptoms   Date Last Reviewed: 3/10/2016    0754-8179 [employer]. [address] [place] [address]. All rights reserved. This information is not intended as a substitute for professional medical care. Always follow your healthcare professional's instructions.    [facility] Injection Discharge Instructions      You may shower, however avoid swimming, tub baths or hot tubs for 24 hours following your procedure    You may have a mild to moderate increase in pain for several days following the injection.    It may take up to 14 days for the steroid medication to start working although you may feel the effect as early as a few days after the procedure.    You may use ice packs for 10-15 minutes, 3 to 4 times a day at the injection site for comfort    You may use anti-inflammatory medications (such as Ibuprofen or Aleve or Advil) or Tylenol for pain control if necessary    If you were fasting, you may resume your normal diet and medications after the procedure    If you have diabetes, check your blood sugar more frequently than usual as your blood sugar may be higher than normal for 10-14 days following a steroid injection. Contact your doctor who manages your diabetes if your blood sugar is higher than usual      If you experience any of the following, call [facility] @ [phone] or [phone]  -Fever over 100 degree F  -Swelling, bleeding, redness, drainage, warmth at the injection site  -  New or worsening pain

## 2019-03-04 NOTE — PROGRESS NOTES
Small Joint Injection/Arthrocentesis: L thumb MCP  Date/Time: 3/4/2019 10:20 AM  Performed by: Mc Marshall MD  Authorized by: Mc Marshall MD     Indications:  Pain  Needle Size:  27 G  Guidance: ultrasound    Approach:  Radial  Location:  Thumb  Site:  L thumb MCP  Medications:  6 mg betamethasone acet & sod phos 6 (3-3) MG/ML; 0.5 mL ropivacaine 5 MG/ML  Medications comment:  Actual amount of celestone used 0.5 mL  Outcome:  Tolerated well, no immediate complications  Procedure discussed: discussed risks, benefits, and alternatives    Consent Given by:  Patient  Timeout: timeout called immediately prior to procedure    Prep: patient was prepped and draped in usual sterile fashion     Patient had significant amount of pain with injection secondary to fluid going into the CMC joint.  Pain is significant improved 5-10 minutes postinjection and improvement in range of motion as well as strength compared to when she came in.  Given the significant pain that she was in for the left side patient deferred the right CMC injection given she reports it is not as painful as the left.  We will have her continue to monitor symptoms, after cares given.  She can follow-up as needed for repeat injections in 3 months or sooner for the right side.  Patient understands and agrees with plan.    Mc Marshall

## 2019-03-05 RX ORDER — ROPIVACAINE HYDROCHLORIDE 5 MG/ML
0.5 INJECTION, SOLUTION EPIDURAL; INFILTRATION; PERINEURAL
Status: SHIPPED | OUTPATIENT
Start: 2019-03-04

## 2019-03-05 RX ORDER — BETAMETHASONE SODIUM PHOSPHATE AND BETAMETHASONE ACETATE 3; 3 MG/ML; MG/ML
6 INJECTION, SUSPENSION INTRA-ARTICULAR; INTRALESIONAL; INTRAMUSCULAR; SOFT TISSUE
Status: SHIPPED | OUTPATIENT
Start: 2019-03-04

## 2019-03-12 ENCOUNTER — TELEPHONE (OUTPATIENT)
Dept: FAMILY MEDICINE | Facility: CLINIC | Age: 64
End: 2019-03-12

## 2019-03-12 NOTE — TELEPHONE ENCOUNTER
Patient is calling stating she is going to be applying for social security disability and would like to talk to Dr. Aguilar about this. Please advise.    Ami [place]

## 2019-05-02 ENCOUNTER — TRANSFERRED RECORDS (OUTPATIENT)
Dept: HEALTH INFORMATION MANAGEMENT | Facility: CLINIC | Age: 64
End: 2019-05-02

## 2019-08-19 ENCOUNTER — TELEPHONE (OUTPATIENT)
Dept: FAMILY MEDICINE | Facility: CLINIC | Age: 64
End: 2019-08-19

## 2019-08-19 NOTE — TELEPHONE ENCOUNTER
Panel Management Review      Patient has the following on her problem list:     Depression / Dysthymia review    Measure:  Needs PHQ-9 score of 4 or less during index window.  Administer PHQ-9 and if score is 5 or more, send encounter to provider for next steps.    5 - 7 month window range: overdue    PHQ-9 SCORE 8/26/2015 1/8/2016 9/11/2018   PHQ-9 Total Score - - -   PHQ-9 Total Score MyChart - - 1 (Minimal depression)   PHQ-9 Total Score 3 1 1       If PHQ-9 recheck is 5 or more, route to provider for next steps.    Patient is due for:  PHQ9      Composite cancer screening  Chart review shows that this patient is due/due soon for the following Colonoscopy  Summary:    Patient is due/failing the following:   COLONOSCOPY and PHQ9    Action needed:   Patient needs office visit for update meds., Patient needs to do PHQ9. and colonoscopy    Type of outreach:    Phone, spoke to patient.  and scheduled a physical. Will schedule a colonoscopy after this appt.    Questions for provider review:    None                                                                                                                                    Marcella Guan CMA

## 2019-09-24 ENCOUNTER — OFFICE VISIT (OUTPATIENT)
Dept: FAMILY MEDICINE | Facility: CLINIC | Age: 64
End: 2019-09-24
Payer: COMMERCIAL

## 2019-09-24 VITALS
OXYGEN SATURATION: 98 % | HEIGHT: 63 IN | SYSTOLIC BLOOD PRESSURE: 104 MMHG | DIASTOLIC BLOOD PRESSURE: 74 MMHG | BODY MASS INDEX: 24.41 KG/M2 | TEMPERATURE: 97.7 F | RESPIRATION RATE: 60 BRPM | HEART RATE: 61 BPM | WEIGHT: 137.8 LBS

## 2019-09-24 DIAGNOSIS — Z12.11 COLON CANCER SCREENING: ICD-10-CM

## 2019-09-24 DIAGNOSIS — F33.0 MAJOR DEPRESSIVE DISORDER, RECURRENT EPISODE, MILD (H): ICD-10-CM

## 2019-09-24 DIAGNOSIS — K50.90 CROHN'S DISEASE WITHOUT COMPLICATION, UNSPECIFIED GASTROINTESTINAL TRACT LOCATION (H): ICD-10-CM

## 2019-09-24 DIAGNOSIS — Z23 NEED FOR PROPHYLACTIC VACCINATION AND INOCULATION AGAINST INFLUENZA: ICD-10-CM

## 2019-09-24 DIAGNOSIS — Z23 ENCOUNTER FOR IMMUNIZATION: ICD-10-CM

## 2019-09-24 DIAGNOSIS — Z00.00 ROUTINE GENERAL MEDICAL EXAMINATION AT A HEALTH CARE FACILITY: Primary | ICD-10-CM

## 2019-09-24 PROCEDURE — 90471 IMMUNIZATION ADMIN: CPT | Performed by: FAMILY MEDICINE

## 2019-09-24 PROCEDURE — 90472 IMMUNIZATION ADMIN EACH ADD: CPT | Performed by: FAMILY MEDICINE

## 2019-09-24 PROCEDURE — 90682 RIV4 VACC RECOMBINANT DNA IM: CPT | Performed by: FAMILY MEDICINE

## 2019-09-24 PROCEDURE — 90714 TD VACC NO PRESV 7 YRS+ IM: CPT | Performed by: FAMILY MEDICINE

## 2019-09-24 PROCEDURE — 99396 PREV VISIT EST AGE 40-64: CPT | Performed by: FAMILY MEDICINE

## 2019-09-24 RX ORDER — CITALOPRAM HYDROBROMIDE 20 MG/1
10 TABLET ORAL DAILY
Qty: 90 TABLET | Refills: 1 | COMMUNITY
Start: 2019-09-24 | End: 2020-02-03

## 2019-09-24 RX ORDER — TRAZODONE HYDROCHLORIDE 50 MG/1
TABLET, FILM COATED ORAL
Qty: 90 TABLET | Refills: 3 | COMMUNITY
Start: 2019-09-24 | End: 2020-03-20

## 2019-09-24 ASSESSMENT — ENCOUNTER SYMPTOMS
DIARRHEA: 0
HEMATURIA: 0
CHILLS: 0
COUGH: 0
HEMATOCHEZIA: 0
HEARTBURN: 0
EYE PAIN: 0
ABDOMINAL PAIN: 0
JOINT SWELLING: 0
FEVER: 0
ARTHRALGIAS: 0
NERVOUS/ANXIOUS: 0
HEADACHES: 1
CONSTIPATION: 0
DIZZINESS: 0
FREQUENCY: 0

## 2019-09-24 ASSESSMENT — ANXIETY QUESTIONNAIRES
3. WORRYING TOO MUCH ABOUT DIFFERENT THINGS: NOT AT ALL
6. BECOMING EASILY ANNOYED OR IRRITABLE: NOT AT ALL
2. NOT BEING ABLE TO STOP OR CONTROL WORRYING: NOT AT ALL
1. FEELING NERVOUS, ANXIOUS, OR ON EDGE: NOT AT ALL
7. FEELING AFRAID AS IF SOMETHING AWFUL MIGHT HAPPEN: NOT AT ALL
7. FEELING AFRAID AS IF SOMETHING AWFUL MIGHT HAPPEN: NOT AT ALL
GAD7 TOTAL SCORE: 0
5. BEING SO RESTLESS THAT IT IS HARD TO SIT STILL: NOT AT ALL
GAD7 TOTAL SCORE: 0
4. TROUBLE RELAXING: NOT AT ALL
GAD7 TOTAL SCORE: 0

## 2019-09-24 ASSESSMENT — PATIENT HEALTH QUESTIONNAIRE - PHQ9
SUM OF ALL RESPONSES TO PHQ QUESTIONS 1-9: 0
10. IF YOU CHECKED OFF ANY PROBLEMS, HOW DIFFICULT HAVE THESE PROBLEMS MADE IT FOR YOU TO DO YOUR WORK, TAKE CARE OF THINGS AT HOME, OR GET ALONG WITH OTHER PEOPLE: NOT DIFFICULT AT ALL
SUM OF ALL RESPONSES TO PHQ QUESTIONS 1-9: 0

## 2019-09-24 ASSESSMENT — MIFFLIN-ST. JEOR: SCORE: 1159.19

## 2019-09-24 NOTE — NURSING NOTE
"Chief Complaint   Patient presents with     Physical       Initial /74 (BP Location: Right arm, Patient Position: Chair, Cuff Size: Adult Large)   Pulse 61   Temp 97.7  F (36.5  C) (Tympanic)   Resp (!) 60   Ht 1.6 m (5' 3\")   Wt 62.5 kg (137 lb 12.8 oz)   LMP 06/14/2006   SpO2 98%   BMI 24.41 kg/m   Estimated body mass index is 24.41 kg/m  as calculated from the following:    Height as of this encounter: 1.6 m (5' 3\").    Weight as of this encounter: 62.5 kg (137 lb 12.8 oz).    Patient presents to the clinic using No DME    Health Maintenance that is potentially due pending provider review:  Colonoscopy/FIT    Gave pt phone number/pended order to schedule mammo and/or colonoscopy(or FIT)    Is there anyone who you would like to be able to receive your results? No  If yes have patient fill out LIBERTAD    "

## 2019-09-24 NOTE — PROGRESS NOTES
SUBJECTIVE:   CC: Jenny Banks is an 63 year old woman who presents for preventive health visit.   PHQ-9 ([employer]) 2019   1.  Little interest or pleasure in doing things 0   2.  Feeling down, depressed, or hopeless 0   3.  Trouble falling or staying asleep, or sleeping too much 0   4.  Feeling tired or having little energy 0   5.  Poor appetite or overeating 0   6.  Feeling bad about yourself 0   7.  Trouble concentrating 0   8.  Moving slowly or restless 0   9.  Suicidal or self-harm thoughts 0   PHQ-9 Total Score 0     LUCIANA-7   [employer], 2002; Used with Permission) 2019   1. Feeling nervous, anxious, or on edge 0   2. Not being able to stop or control worrying 0   3. Worrying too much about different things 0   4. Trouble relaxing 0   5. Being so restless that it is hard to sit still 0   6. Becoming easily annoyed or irritable 0   7. Feeling afraid, as if something awful might happen 0   LUCIANA-7 Total Score 0       Healthy Habits:     Getting at least 3 servings of Calcium per day:  Yes    Bi-annual eye exam:  Yes    Dental care twice a year:  NO    Sleep apnea or symptoms of sleep apnea:  None    Diet:  Gluten-free/reduced    Frequency of exercise:  1 day/week    Duration of exercise:  15-30 minutes    Taking medications regularly:  Yes    Medication side effects:  Not applicable    PHQ-2 Total Score: 0    Additional concerns today:  No          Depression Followup    How are you doing with your depression since your last visit? No change    Are you having other symptoms that might be associated with depression? No    Have you had a significant life event?  No     Are you feeling anxious or having panic attacks?   No    Do you have any concerns with your use of alcohol or other drugs? No    Social History     Tobacco Use     Smoking status: Former Smoker     Types: Cigarettes     Last attempt to quit: 5/10/1994     Years since quittin.3     Smokeless tobacco: Never Used     Tobacco comment:  quit    Substance Use Topics     Alcohol use: No     Drug use: No     PHQ 2016   PHQ-9 Total Score 1 1 0   Q9: Thoughts of better off dead/self-harm past 2 weeks Not at all Not at all Not at all     LUCIANA-7 SCORE 2016   Total Score - - -   Total Score - 0 (minimal anxiety) 0 (minimal anxiety)   Total Score 1 0 0       In the past two weeks have you had thoughts of suicide or self-harm?  No.    Do you have concerns about your personal safety or the safety of others?   No    Suicide Assessment Five-step Evaluation and Treatment (SAFE-T)    Today's PHQ-2 Score:   PHQ-2 (  [employer]) 2019   Q1: Little interest or pleasure in doing things 0   Q2: Feeling down, depressed or hopeless 0   PHQ-2 Score 0   Q1: Little interest or pleasure in doing things Not at all   Q2: Feeling down, depressed or hopeless Not at all   PHQ-2 Score 0       Abuse: Current or Past(Physical, Sexual or Emotional)- No  Do you feel safe in your environment? Yes    Social History     Tobacco Use     Smoking status: Former Smoker     Types: Cigarettes     Last attempt to quit: 5/10/1994     Years since quittin.3     Smokeless tobacco: Never Used     Tobacco comment: quit    Substance Use Topics     Alcohol use: No         Alcohol Use 2019   Prescreen: >3 drinks/day or >7 drinks/week? No   Prescreen: >3 drinks/day or >7 drinks/week? -       Reviewed orders with patient.  Reviewed health maintenance and updated orders accordingly - Yes  Labs reviewed in EPIC    Mammogram Screening: Patient over age 50, mutual decision to screen reflected in health maintenance.    Pertinent mammograms are reviewed under the imaging tab.  History of abnormal Pap smear: NO - age 30- 65 PAP every 3 years recommended  PAP / HPV 2007   PAP NIL NIL     Reviewed and updated as needed this visit by clinical staff  Tobacco  Allergies  Meds  Problems  Med Hx  Surg Hx  Fam Hx         Reviewed  "and updated as needed this visit by Provider  Tobacco  Allergies  Meds  Problems  Med Hx  Surg Hx  Fam Hx            Review of Systems   Constitutional: Negative for chills and fever.   HENT: Negative for congestion, ear pain and hearing loss.    Eyes: Negative for pain.   Respiratory: Negative for cough.    Cardiovascular: Negative for chest pain and peripheral edema.   Gastrointestinal: Negative for abdominal pain, constipation, diarrhea, heartburn and hematochezia.   Genitourinary: Negative for frequency, genital sores and hematuria.   Musculoskeletal: Negative for arthralgias and joint swelling.   Neurological: Positive for headaches. Negative for dizziness.   Psychiatric/Behavioral: Negative for mood changes. The patient is not nervous/anxious.           OBJECTIVE:   /74 (BP Location: Right arm, Patient Position: Chair, Cuff Size: Adult Large)   Pulse 61   Temp 97.7  F (36.5  C) (Tympanic)   Resp (!) 60   Ht 1.6 m (5' 3\")   Wt 62.5 kg (137 lb 12.8 oz)   LMP 06/14/2006   SpO2 98%   BMI 24.41 kg/m    Physical Exam  GENERAL APPEARANCE: healthy, alert and no distress  EYES: Eyes grossly normal to inspection, PERRL and conjunctivae and sclerae normal  HENT: ear canals and TM's normal, nose and mouth without ulcers or lesions, oropharynx clear and oral mucous membranes moist  NECK: no adenopathy, no asymmetry, masses, or scars and thyroid normal to palpation  RESP: lungs clear to auscultation - no rales, rhonchi or wheezes  BREAST: normal without masses, tenderness or nipple discharge and no palpable axillary masses or adenopathy  CV: regular rate and rhythm, normal S1 S2, no S3 or S4, no murmur, click or rub, no peripheral edema and peripheral pulses strong  ABDOMEN: soft, nontender, no hepatosplenomegaly, no masses and bowel sounds normal  MS: no musculoskeletal defects are noted and gait is age appropriate without ataxia  SKIN: no suspicious lesions or rashes  NEURO: Normal strength and tone, " "sensory exam grossly normal, mentation intact and speech normal  PSYCH: mentation appears normal and affect normal/bright    Diagnostic Test Results:  Labs reviewed in Epic    ASSESSMENT/PLAN:   1. Routine general medical examination at a health care facility      2. Major depressive disorder, recurrent episode, mild (H)  Stable no change in treatment plan.   - traZODone (DESYREL) 50 MG tablet; TAKE 1/2 TABLET BY MOUTH NIGHTLY AS NEEDED FOR SLEEP  Dispense: 90 tablet; Refill: 3  - citalopram (CELEXA) 20 MG tablet; Take 0.5 tablets (10 mg) by mouth daily  Dispense: 90 tablet; Refill: 1    3. Colon cancer screening    - GASTROENTEROLOGY ADULT REF PROCEDURE ONLY    4. Crohn's disease without complication, unspecified gastrointestinal tract location (H)  Stable no change in treatment plan.       COUNSELING:  Reviewed preventive health counseling, as reflected in patient instructions    Estimated body mass index is 24.41 kg/m  as calculated from the following:    Height as of this encounter: 1.6 m (5' 3\").    Weight as of this encounter: 62.5 kg (137 lb 12.8 oz).         reports that she quit smoking about 25 years ago. Her smoking use included cigarettes. She has never used smokeless tobacco.      Counseling Resources:  ATP IV Guidelines  Pooled Cohorts Equation Calculator  Breast Cancer Risk Calculator  FRAX Risk Assessment  ICSI Preventive Guidelines  Dietary Guidelines for Americans, 2010  USDA's MyPlate  ASA Prophylaxis  Lung CA Screening    Yaima Aguilar MD  [facility]  "

## 2019-09-24 NOTE — PATIENT INSTRUCTIONS

## 2019-09-24 NOTE — NURSING NOTE
Prior to injection verified patient identity using patient's name and date of birth.      Prior to immunization administration, verified patients identity using patient s name and date of birth. Please see Immunization Activity for additional information.     Screening Questionnaire for Adult Immunization    Are you sick today?   No   Do you have allergies to medications, food, a vaccine component or latex?   No   Have you ever had a serious reaction after receiving a vaccination?   No   Do you have a long-term health problem with heart disease, lung disease, asthma, kidney disease, metabolic disease (e.g. diabetes), anemia, or other blood disorder?   No   Do you have cancer, leukemia, HIV/AIDS, or any other immune system problem?   No   In the past 3 months, have you taken medications that affect  your immune system, such as prednisone, other steroids, or anticancer drugs; drugs for the treatment of rheumatoid arthritis, Crohn s disease, or psoriasis; or have you had radiation treatments?   No   Have you had a seizure, or a brain or other nervous system problem?   No   During the past year, have you received a transfusion of blood or blood     products, or been given immune (gamma) globulin or antiviral drug?   No   For women: Are you pregnant or is there a chance you could become        pregnant during the next month?   No   Have you received any vaccinations in the past 4 weeks?   No     Immunization questionnaire answers were all negative.         Patient instructed to remain in clinic for 15 minutes afterwards, and to report any adverse reaction to me immediately.       Screening performed by Marcella Guan CMA on 9/24/2019 at 11:39 AM.

## 2019-09-25 ASSESSMENT — ANXIETY QUESTIONNAIRES: GAD7 TOTAL SCORE: 0

## 2019-10-24 DIAGNOSIS — K50.90 CROHN'S DISEASE WITHOUT COMPLICATION, UNSPECIFIED GASTROINTESTINAL TRACT LOCATION (H): ICD-10-CM

## 2019-10-24 RX ORDER — CHOLESTYRAMINE LIGHT 4 G/5.7G
POWDER, FOR SUSPENSION ORAL
Qty: 239.4 G | Refills: 11 | Status: SHIPPED | OUTPATIENT
Start: 2019-10-24 | End: 2020-12-02

## 2019-10-24 NOTE — TELEPHONE ENCOUNTER
Requested Prescriptions   Pending Prescriptions Disp Refills     cholestyramine light (PREVALITE) 4 GM powder 239.4 g 11     Sig: MIX ONE SCOOP (4 GRAMS) AND DRINK ONCE DAILY AS DIRECTED       There is no refill protocol information for this order        Last Written Prescription Date:  12/20/18  Last Fill Quantity: 239.4g,  # refills: 11   Last office visit: 9/24/2019 with prescribing provider:  Jeff   Future Office Visit:

## 2020-02-03 ENCOUNTER — TELEPHONE (OUTPATIENT)
Dept: FAMILY MEDICINE | Facility: CLINIC | Age: 65
End: 2020-02-03

## 2020-02-03 DIAGNOSIS — K50.90 CROHN'S DISEASE WITHOUT COMPLICATION, UNSPECIFIED GASTROINTESTINAL TRACT LOCATION (H): ICD-10-CM

## 2020-02-03 DIAGNOSIS — F33.0 MAJOR DEPRESSIVE DISORDER, RECURRENT EPISODE, MILD (H): ICD-10-CM

## 2020-02-03 RX ORDER — CITALOPRAM HYDROBROMIDE 20 MG/1
10 TABLET ORAL DAILY
Qty: 30 TABLET | Refills: 0 | Status: SHIPPED | OUTPATIENT
Start: 2020-02-03 | End: 2020-02-03

## 2020-02-03 RX ORDER — CITALOPRAM HYDROBROMIDE 20 MG/1
10 TABLET ORAL DAILY
Qty: 45 TABLET | Refills: 0 | Status: SHIPPED | OUTPATIENT
Start: 2020-02-03 | End: 2020-03-31

## 2020-02-03 NOTE — TELEPHONE ENCOUNTER
Patient called, asking why this is only for 1 month?  States that she had her physical in September and usually gets 1 year of refills.

## 2020-02-03 NOTE — TELEPHONE ENCOUNTER
Reason for Call:  Medication or medication refill:    Do you use a [facility]?  Name of the pharmacy and phone number for the current request:  [facility] - [phone]    Name of the medication requested:    citalopram (CELEXA) 20 MG tablet  Last Written Prescription Date:  09/24/19  Last Fill Quantity: 90,  # refills: 1   Last office visit: 9/24/2019 with prescribing provider:  Dr. Aguilar   Future Office Visit:            Call taken on 2/3/2020 at 11:01 AM by Alisa Dietz

## 2020-03-17 ENCOUNTER — VIRTUAL VISIT (OUTPATIENT)
Dept: FAMILY MEDICINE | Facility: OTHER | Age: 65
End: 2020-03-17

## 2020-03-17 NOTE — PROGRESS NOTES
"Date: 2020 12:56:06  Clinician: Sherman Odonnell  Clinician NPI: [id number]  Patient: Jenny Banks  Patient : 1955  Patient Address: [facility] [place] [address]  Patient Phone: [phone]  Visit Protocol: URI  Patient Summary:  Jenny is a 64 year old ( : 1955 ) female who initiated a Visit for COVID-19 (Coronavirus) evaluation and screening. When asked the question \"Please sign me up to receive news, health information and promotions from [employer].\", Jenny responded \"No\".    Jenny states her symptoms started suddenly 3-6 days ago.   Her symptoms consist of a cough, chills, malaise, a headache, rhinitis, and myalgia. She is experiencing mild difficulty breathing with activities but can speak normally in full sentences.   Symptom details     Nasal secretions: The color of her mucus is clear.    Cough: Jenny coughs every 5-10 minutes and her cough is not more bothersome at night. Phlegm does not come into her throat when she coughs. She does not believe her cough is caused by post-nasal drip.     Headache: She states the headache is mild (1-3 on a 10 point pain scale).      Jenny denies having wheezing, sore throat, nasal congestion, teeth pain, fever, ear pain, and facial pain or pressure. She also denies taking antibiotic medication for the symptoms, having recent facial or sinus surgery in the past 60 days, and double sickening (worsening symptoms after initial improvement).   Precipitating events  She has not recently been exposed to someone with influenza. Jenny has been in close contact with the following high risk individuals: adults 65 or older.   Pertinent COVID-19 (Coronavirus) information  Jenny has not traveled internationally or to the areas where COVID-19 (Coronavirus) is widespread in the last 14 days before the start of her symptoms.   Jenny has not had a close contact with a laboratory-confirmed COVID-19 patient within 14 days of symptom " onset. She also has not had a close contact with a suspected COVID-19 patient within 14 days of symptom onset.   Jenny is not a healthcare worker and does not work in a healthcare facility.   Pertinent medical history  Jenny does not get yeast infections when she takes antibiotics.   Jenny does not need a return to work/school note.   Weight: 126 lbs   Jenny does not smoke or use smokeless tobacco.   Weight: 126 lbs    MEDICATIONS: Cholestyramine Light oral, ALLERGIES: NKDA  Clinician Response:  Dear Jenny,  Based on the information provided, you have an influenza-like illness. This is an infection that has the same symptoms of the flu, but the specific virus is not known. Lab testing would be required to confirm the flu virus, but this is often not necessary because the treatment will be the same no matter what is causing your symptoms.  Your symptoms should improve gradually over the next week.  Medication information  The CDC recommends treatment for flu only if antiviral medications can be started within 48 hours of the first flu symptoms or if you fall into one of the high-risk groups that are more likely to get flu complications.  Since you do not meet guidelines for treatment with antiviral medications, antiviral treatment is not recommended for you. Treatment focuses on controlling your symptoms.  Unless you are allergic to the over-the-counter medication(s) below, I recommend using:       Acetaminophen (Tylenol or store brand) oral tablet. Take 1-2 tablets by mouth every 4-6 hours to help with the discomfort.      Ibuprofen (Advil or store brand) 200 mg oral tablet. Take 1-3 tablets (200-600 mg) by mouth every 8 hours to help with the discomfort. Make sure to take the ibuprofen with food. Do not exceed 2400 mg in 24 hours.      Guaifenesin + dextromethorphan (Robitussin DM, Mucinex DM, or store brand).     Over-the-counter medications do not require a prescription. Ask the pharmacist if you  have any questions.  Self care  The following tips will keep you as comfortable as possible while you recover:     Rest    Drink plenty of water and other liquids    Take a hot shower to loosen congestion    Take a spoonful of honey to reduce your cough     If you have a fever, stay home until your temperature has returned to normal for 24 hours and you feel well enough for daily activities. And of course, wash your hands often to prevent spreading the flu and other illnesses. However, the best way to prevent the flu is to get a flu shot before each flu season.  When to seek care  Please be seen in a clinic or urgent care if new symptoms develop, or symptoms become worse.  Additional treatment plan   Based on the information you have provided, you do have symptoms that are consistent with Coronavirus (COVID-19).  The coronavirus causes mild to severe respiratory illness with the most common symptoms including fever, cough and difficulty breathing. Unfortunately, many viruses cause similar symptoms and it can be difficult to distinguish between viruses, especially in mild cases, so we are presuming that anyone with cough or fever has coronavirus at this time.  Coronavirus/COVID-19 has reached the point of community spread in [place], meaning that we are finding the virus in people with no known exposure risk for russ the virus. Given the increasing commonness of coronavirus in the community we are no longer testing patients who are not critically ill.  For everyone else who has cough or fever, you should assume you are infected with coronavirus. Accordingly, you should self-quarantine for fourteen days from the first day your symptoms started. You should call if you find increasing shortness of breath, wheezing or sustained fever above 101.5. If you are significantly short of breath or experience chest pain you should call 911 or report to the nearest emergency department for urgent evaluation.    Isolate  yourself at home.   Do Not allow any visitors  Do Not go to work or school  Do Not go to [employer],  centers, shopping, or other public places.  Do Not shake hands.  Avoid close contact with others (hugging, kissing).   Protect Others:    Cover Your Mouth and Nose with a mask, disposable tissue or wash cloth to avoid spreading germs to others.  Wash your hands and face frequently with soap and water.   If you develop significant shortness of breath that prevents you from doing normal activities, please call 911 or proceed to the nearest emergency room and alert them immediately that you have been in self-isolation for possible coronavirus.   For more information about COVID19 and options for caring for yourself at home, please visit the CDC website at https://www.cdc.gov/coronavirus/2019-ncov/about/steps-when-sick.htmlFor more options for care at [facility], please visit our website at [email or web address]     Diagnosis: Acute upper respiratory infection, unspecified  Diagnosis ICD: J06.9

## 2020-03-20 DIAGNOSIS — F33.0 MAJOR DEPRESSIVE DISORDER, RECURRENT EPISODE, MILD (H): ICD-10-CM

## 2020-03-20 RX ORDER — TRAZODONE HYDROCHLORIDE 50 MG/1
25 TABLET, FILM COATED ORAL
Qty: 45 TABLET | Refills: 0 | Status: SHIPPED | OUTPATIENT
Start: 2020-03-20 | End: 2020-03-24

## 2020-03-20 NOTE — TELEPHONE ENCOUNTER
Pt was looking for refill on trazodone, she is coming from [place] today to  another one & was hoping to do it all in one trip.      Thank You,  Asia Beckford, [facility], [place]

## 2020-03-20 NOTE — TELEPHONE ENCOUNTER
"Prescription approved per [facility] Refill Protocol. Last OV 9/24/19 with Dr. Aguilar: Return in about 1 year (around 9/24/2020).     Requested Prescriptions   Pending Prescriptions Disp Refills     traZODone (DESYREL) 50 MG tablet 45 tablet 0     Sig: Take 0.5 tablets (25 mg) by mouth nightly as needed for sleep       Serotonin Modulators Passed - 3/20/2020 12:52 PM        Passed - Recent (12 mo) or future (30 days) visit within the authorizing provider's specialty     Patient has had an office visit with the authorizing provider or a provider within the authorizing providers department within the previous 12 mos or has a future within next 30 days. See \"Patient Info\" tab in inbasket, or \"Choose Columns\" in Meds & Orders section of the refill encounter.              Passed - Medication is active on med list        Passed - Patient is age 18 or older        Passed - No active pregnancy on record        Passed - No positive pregnancy test in past 12 months             PHQ 1/8/2016 9/11/2018 9/24/2019   PHQ-9 Total Score 1 1 0   Q9: Thoughts of better off dead/self-harm past 2 weeks Not at all Not at all Not at all     Mary MALONEY RN      "

## 2020-03-23 DIAGNOSIS — F33.0 MAJOR DEPRESSIVE DISORDER, RECURRENT EPISODE, MILD (H): ICD-10-CM

## 2020-03-23 RX ORDER — TRAZODONE HYDROCHLORIDE 50 MG/1
25 TABLET, FILM COATED ORAL
Qty: 45 TABLET | Refills: 0 | Status: CANCELLED | OUTPATIENT
Start: 2020-03-23

## 2020-03-23 NOTE — TELEPHONE ENCOUNTER
Trazodone refill request from [facility] pharmacy for Trazodone 50 mg tab to take One tablet by month nightly as needed for sleep. Asking for #90    Rx was sent on 3/20/20 for #45 and directions to take 0.5 tablet as needed for sleep

## 2020-03-24 RX ORDER — TRAZODONE HYDROCHLORIDE 50 MG/1
25 TABLET, FILM COATED ORAL
Qty: 90 TABLET | Refills: 0 | Status: SHIPPED | OUTPATIENT
Start: 2020-03-24 | End: 2020-05-26

## 2020-03-31 DIAGNOSIS — F33.0 MAJOR DEPRESSIVE DISORDER, RECURRENT EPISODE, MILD (H): ICD-10-CM

## 2020-03-31 RX ORDER — CITALOPRAM HYDROBROMIDE 20 MG/1
TABLET ORAL
Qty: 45 TABLET | Refills: 1 | Status: SHIPPED | OUTPATIENT
Start: 2020-03-31 | End: 2021-02-11

## 2020-03-31 NOTE — TELEPHONE ENCOUNTER
"Requested Prescriptions   Pending Prescriptions Disp Refills     citalopram (CELEXA) 20 MG tablet [Pharmacy Med Name: CITALOPRAM HYDROBROMIDE 20MG TABS] 45 tablet 0     Sig: TAKE ONE-HALF TABLET BY MOUTH EVERY DAY       SSRIs Protocol Failed - 3/31/2020  8:32 AM        Failed - PHQ-9 score less than 5 in past 6 months     Please review last PHQ-9 score.           Passed - Medication is active on med list        Passed - Patient is age 18 or older        Passed - No active pregnancy on record        Passed - No positive pregnancy test in last 12 months        Passed - Recent (6 mo) or future (30 days) visit within the authorizing provider's specialty     Patient had office visit in the last 6 months or has a visit in the next 30 days with authorizing provider or within the authorizing provider's specialty.  See \"Patient Info\" tab in inbasket, or \"Choose Columns\" in Meds & Orders section of the refill encounter.               Last Written Prescription Date:  2/3/20  Last Fill Quantity: 45,  # refills: 0   Last office visit: 9/24/2019 with prescribing provider:      Future Office Visit:      "

## 2020-05-26 DIAGNOSIS — F33.0 MAJOR DEPRESSIVE DISORDER, RECURRENT EPISODE, MILD (H): ICD-10-CM

## 2020-05-26 RX ORDER — TRAZODONE HYDROCHLORIDE 50 MG/1
25 TABLET, FILM COATED ORAL
Qty: 90 TABLET | Refills: 0 | Status: SHIPPED | OUTPATIENT
Start: 2020-05-26 | End: 2020-05-27

## 2020-05-27 ENCOUNTER — TELEPHONE (OUTPATIENT)
Dept: FAMILY MEDICINE | Facility: CLINIC | Age: 65
End: 2020-05-27

## 2020-05-27 DIAGNOSIS — F33.0 MAJOR DEPRESSIVE DISORDER, RECURRENT EPISODE, MILD (H): ICD-10-CM

## 2020-05-27 RX ORDER — TRAZODONE HYDROCHLORIDE 50 MG/1
50 TABLET, FILM COATED ORAL
Qty: 90 TABLET | Refills: 0 | Status: SHIPPED | OUTPATIENT
Start: 2020-05-27 | End: 2020-11-10

## 2020-05-27 NOTE — TELEPHONE ENCOUNTER
Jenny says originally her Trazodone Rx was to take one full tablet but she had cut it down to taking 1/2 tablet at HS.  Lately she has had to go back to taking a full tablet at HS.  Rx was sent in yesterday to the pharmacy with directions of 1/2 at HS for quantity of #90.  Can this be re-sent with the directions of one tablet at HS?  Pharmacy may only give her #45 tablets for 90 days with the current directions.

## 2020-11-10 DIAGNOSIS — F33.0 MAJOR DEPRESSIVE DISORDER, RECURRENT EPISODE, MILD (H): ICD-10-CM

## 2020-11-10 RX ORDER — TRAZODONE HYDROCHLORIDE 50 MG/1
50 TABLET, FILM COATED ORAL
Qty: 30 TABLET | Refills: 0 | Status: SHIPPED | OUTPATIENT
Start: 2020-11-10 | End: 2021-02-11

## 2020-12-01 DIAGNOSIS — K50.90 CROHN'S DISEASE WITHOUT COMPLICATION, UNSPECIFIED GASTROINTESTINAL TRACT LOCATION (H): ICD-10-CM

## 2020-12-02 RX ORDER — CHOLESTYRAMINE LIGHT 4 G/5.7G
POWDER, FOR SUSPENSION ORAL
Qty: 239.4 G | Refills: 11 | Status: SHIPPED | OUTPATIENT
Start: 2020-12-02 | End: 2021-05-28

## 2021-02-09 DIAGNOSIS — F33.0 MAJOR DEPRESSIVE DISORDER, RECURRENT EPISODE, MILD (H): ICD-10-CM

## 2021-02-11 RX ORDER — TRAZODONE HYDROCHLORIDE 50 MG/1
TABLET, FILM COATED ORAL
Qty: 90 TABLET | Refills: 0 | Status: SHIPPED | OUTPATIENT
Start: 2021-02-11 | End: 2021-05-28

## 2021-02-11 RX ORDER — CITALOPRAM HYDROBROMIDE 20 MG/1
TABLET ORAL
Qty: 45 TABLET | Refills: 0 | Status: SHIPPED | OUTPATIENT
Start: 2021-02-11 | End: 2021-05-28

## 2021-04-03 ENCOUNTER — HEALTH MAINTENANCE LETTER (OUTPATIENT)
Age: 66
End: 2021-04-03

## 2021-04-05 ENCOUNTER — IMMUNIZATION (OUTPATIENT)
Dept: FAMILY MEDICINE | Facility: CLINIC | Age: 66
End: 2021-04-05
Payer: COMMERCIAL

## 2021-04-05 PROCEDURE — 91301 PR COVID VAC MODERNA 100 MCG/0.5 ML IM: CPT

## 2021-04-05 PROCEDURE — 0011A PR COVID VAC MODERNA 100 MCG/0.5 ML IM: CPT

## 2021-05-03 ENCOUNTER — IMMUNIZATION (OUTPATIENT)
Dept: FAMILY MEDICINE | Facility: CLINIC | Age: 66
End: 2021-05-03
Attending: FAMILY MEDICINE
Payer: COMMERCIAL

## 2021-05-03 PROCEDURE — 91301 PR COVID VAC MODERNA 100 MCG/0.5 ML IM: CPT

## 2021-05-03 PROCEDURE — 0012A PR COVID VAC MODERNA 100 MCG/0.5 ML IM: CPT

## 2021-05-28 ENCOUNTER — OFFICE VISIT (OUTPATIENT)
Dept: FAMILY MEDICINE | Facility: CLINIC | Age: 66
End: 2021-05-28
Payer: COMMERCIAL

## 2021-05-28 VITALS
DIASTOLIC BLOOD PRESSURE: 70 MMHG | HEIGHT: 63 IN | BODY MASS INDEX: 24.8 KG/M2 | WEIGHT: 140 LBS | RESPIRATION RATE: 10 BRPM | HEART RATE: 60 BPM | SYSTOLIC BLOOD PRESSURE: 110 MMHG

## 2021-05-28 DIAGNOSIS — K50.90 CROHN'S DISEASE WITHOUT COMPLICATION, UNSPECIFIED GASTROINTESTINAL TRACT LOCATION (H): ICD-10-CM

## 2021-05-28 DIAGNOSIS — Z00.00 ROUTINE GENERAL MEDICAL EXAMINATION AT A HEALTH CARE FACILITY: Primary | ICD-10-CM

## 2021-05-28 DIAGNOSIS — Z12.11 SCREEN FOR COLON CANCER: ICD-10-CM

## 2021-05-28 DIAGNOSIS — F33.0 MAJOR DEPRESSIVE DISORDER, RECURRENT EPISODE, MILD (H): ICD-10-CM

## 2021-05-28 DIAGNOSIS — Z13.220 SCREENING FOR HYPERLIPIDEMIA: ICD-10-CM

## 2021-05-28 LAB
ALBUMIN SERPL-MCNC: 3.8 G/DL (ref 3.4–5)
ALP SERPL-CCNC: 73 U/L (ref 40–150)
ALT SERPL W P-5'-P-CCNC: 37 U/L (ref 0–50)
ANION GAP SERPL CALCULATED.3IONS-SCNC: 1 MMOL/L (ref 3–14)
AST SERPL W P-5'-P-CCNC: 26 U/L (ref 0–45)
BASOPHILS # BLD AUTO: 0 10E9/L (ref 0–0.2)
BASOPHILS NFR BLD AUTO: 0.2 %
BILIRUB SERPL-MCNC: 0.5 MG/DL (ref 0.2–1.3)
BUN SERPL-MCNC: 14 MG/DL (ref 7–30)
CALCIUM SERPL-MCNC: 8.6 MG/DL (ref 8.5–10.1)
CHLORIDE SERPL-SCNC: 107 MMOL/L (ref 94–109)
CHOLEST SERPL-MCNC: 202 MG/DL
CO2 SERPL-SCNC: 31 MMOL/L (ref 20–32)
CREAT SERPL-MCNC: 0.82 MG/DL (ref 0.52–1.04)
DIFFERENTIAL METHOD BLD: NORMAL
EOSINOPHIL # BLD AUTO: 0.2 10E9/L (ref 0–0.7)
EOSINOPHIL NFR BLD AUTO: 5.3 %
ERYTHROCYTE [DISTWIDTH] IN BLOOD BY AUTOMATED COUNT: 12.8 % (ref 10–15)
GFR SERPL CREATININE-BSD FRML MDRD: 76 ML/MIN/{1.73_M2}
GLUCOSE SERPL-MCNC: 96 MG/DL (ref 70–99)
HCT VFR BLD AUTO: 40 % (ref 35–47)
HDLC SERPL-MCNC: 76 MG/DL
HGB BLD-MCNC: 13.6 G/DL (ref 11.7–15.7)
LDLC SERPL CALC-MCNC: 104 MG/DL
LYMPHOCYTES # BLD AUTO: 1.1 10E9/L (ref 0.8–5.3)
LYMPHOCYTES NFR BLD AUTO: 25.7 %
MCH RBC QN AUTO: 30 PG (ref 26.5–33)
MCHC RBC AUTO-ENTMCNC: 34 G/DL (ref 31.5–36.5)
MCV RBC AUTO: 88 FL (ref 78–100)
MONOCYTES # BLD AUTO: 0.5 10E9/L (ref 0–1.3)
MONOCYTES NFR BLD AUTO: 11.1 %
NEUTROPHILS # BLD AUTO: 2.4 10E9/L (ref 1.6–8.3)
NEUTROPHILS NFR BLD AUTO: 57.7 %
NONHDLC SERPL-MCNC: 126 MG/DL
PLATELET # BLD AUTO: 212 10E9/L (ref 150–450)
POTASSIUM SERPL-SCNC: 4.9 MMOL/L (ref 3.4–5.3)
PROT SERPL-MCNC: 7.2 G/DL (ref 6.8–8.8)
RBC # BLD AUTO: 4.54 10E12/L (ref 3.8–5.2)
SODIUM SERPL-SCNC: 139 MMOL/L (ref 133–144)
TRIGL SERPL-MCNC: 109 MG/DL
WBC # BLD AUTO: 4.1 10E9/L (ref 4–11)

## 2021-05-28 PROCEDURE — 80053 COMPREHEN METABOLIC PANEL: CPT | Performed by: FAMILY MEDICINE

## 2021-05-28 PROCEDURE — 80061 LIPID PANEL: CPT | Performed by: FAMILY MEDICINE

## 2021-05-28 PROCEDURE — 99214 OFFICE O/P EST MOD 30 MIN: CPT | Mod: 25 | Performed by: FAMILY MEDICINE

## 2021-05-28 PROCEDURE — 36415 COLL VENOUS BLD VENIPUNCTURE: CPT | Performed by: FAMILY MEDICINE

## 2021-05-28 PROCEDURE — 85025 COMPLETE CBC W/AUTO DIFF WBC: CPT | Performed by: FAMILY MEDICINE

## 2021-05-28 PROCEDURE — 99396 PREV VISIT EST AGE 40-64: CPT | Performed by: FAMILY MEDICINE

## 2021-05-28 RX ORDER — TRAZODONE HYDROCHLORIDE 50 MG/1
50 TABLET, FILM COATED ORAL AT BEDTIME
Qty: 90 TABLET | Refills: 3 | Status: SHIPPED | OUTPATIENT
Start: 2021-05-28 | End: 2022-06-17

## 2021-05-28 RX ORDER — CHOLESTYRAMINE LIGHT 4 G/5.7G
POWDER, FOR SUSPENSION ORAL
Qty: 239.4 G | Refills: 11 | Status: SHIPPED | OUTPATIENT
Start: 2021-05-28 | End: 2022-02-07

## 2021-05-28 RX ORDER — CITALOPRAM HYDROBROMIDE 20 MG/1
20 TABLET ORAL DAILY
Qty: 90 TABLET | Refills: 3 | Status: SHIPPED | OUTPATIENT
Start: 2021-05-28 | End: 2022-07-08

## 2021-05-28 RX ORDER — MULTIVIT-MIN/IRON/FOLIC ACID/K 18-600-40
CAPSULE ORAL
COMMUNITY
End: 2022-08-30

## 2021-05-28 ASSESSMENT — ENCOUNTER SYMPTOMS
HEMATURIA: 0
NERVOUS/ANXIOUS: 0
CHILLS: 0
DYSURIA: 0
HEMATOCHEZIA: 0
DIARRHEA: 0
WEAKNESS: 0
COUGH: 0
EYE PAIN: 0
MYALGIAS: 0
DIZZINESS: 0
ARTHRALGIAS: 1
FREQUENCY: 0
PALPITATIONS: 0
JOINT SWELLING: 0
CONSTIPATION: 0
SORE THROAT: 0
SHORTNESS OF BREATH: 0
ABDOMINAL PAIN: 0
FEVER: 0
HEARTBURN: 0
NAUSEA: 0
PARESTHESIAS: 1
BREAST MASS: 0
HEADACHES: 0

## 2021-05-28 ASSESSMENT — ANXIETY QUESTIONNAIRES
3. WORRYING TOO MUCH ABOUT DIFFERENT THINGS: NOT AT ALL
GAD7 TOTAL SCORE: 2
1. FEELING NERVOUS, ANXIOUS, OR ON EDGE: SEVERAL DAYS
6. BECOMING EASILY ANNOYED OR IRRITABLE: SEVERAL DAYS
7. FEELING AFRAID AS IF SOMETHING AWFUL MIGHT HAPPEN: NOT AT ALL
5. BEING SO RESTLESS THAT IT IS HARD TO SIT STILL: NOT AT ALL
2. NOT BEING ABLE TO STOP OR CONTROL WORRYING: NOT AT ALL

## 2021-05-28 ASSESSMENT — PATIENT HEALTH QUESTIONNAIRE - PHQ9
5. POOR APPETITE OR OVEREATING: NOT AT ALL
SUM OF ALL RESPONSES TO PHQ QUESTIONS 1-9: 3

## 2021-05-28 ASSESSMENT — MIFFLIN-ST. JEOR: SCORE: 1159.17

## 2021-05-28 NOTE — PROGRESS NOTES
SUBJECTIVE:   CC: Jenny Banks is an 65 year old woman who presents for preventive health visit.       Patient has been advised of split billing requirements and indicates understanding: Yes  Healthy Habits:     Getting at least 3 servings of Calcium per day:  Yes    Bi-annual eye exam:  NO    Dental care twice a year:  NO    Sleep apnea or symptoms of sleep apnea:  None    Diet:  Carbohydrate counting, Gluten-free/reduced and Breakfast skipped    Frequency of exercise:  4-5 days/week    Duration of exercise:  30-45 minutes    Taking medications regularly:  Yes    Medication side effects:  None    PHQ-2 Total Score: 2    Additional concerns today:  Yes      Depression Followup    How are you doing with your depression since your last visit? No change    Are you having other symptoms that might be associated with depression? No    Have you had a significant life event?  No     Are you feeling anxious or having panic attacks?   No    Do you have any concerns with your use of alcohol or other drugs? No    Social History     Tobacco Use     Smoking status: Former Smoker     Types: Cigarettes     Quit date: 5/10/1994     Years since quittin.0     Smokeless tobacco: Never Used     Tobacco comment: quit    Substance Use Topics     Alcohol use: No     Drug use: No     PHQ 2018   PHQ-9 Total Score 1 0 3   Q9: Thoughts of better off dead/self-harm past 2 weeks Not at all Not at all Not at all     LUCIANA-7 SCORE 2018   Total Score - - -   Total Score 0 (minimal anxiety) 0 (minimal anxiety) -   Total Score 0 0 2         Suicide Assessment Five-step Evaluation and Treatment (SAFE-T)      Today's PHQ-2 Score:   PHQ-2 (  [employer]) 2021   Q1: Little interest or pleasure in doing things 1   Q2: Feeling down, depressed or hopeless 1   PHQ-2 Score 2   Q1: Little interest or pleasure in doing things Several days   Q2: Feeling down, depressed or hopeless Several days    PHQ-2 Score 2       Abuse: Current or Past (Physical, Sexual or Emotional) - No  Do you feel safe in your environment? Yes    CROHN'S   She is doing well   sxs stable on meds    She needs colonoscopy and agrees to do that this year     Social History     Tobacco Use     Smoking status: Former Smoker     Types: Cigarettes     Quit date: 5/10/1994     Years since quittin.0     Smokeless tobacco: Never Used     Tobacco comment: quit    Substance Use Topics     Alcohol use: No         Alcohol Use 2021   Prescreen: >3 drinks/day or >7 drinks/week? No   Prescreen: >3 drinks/day or >7 drinks/week? -       Reviewed orders with patient.  Reviewed health maintenance and updated orders accordingly - Yes  Labs reviewed in EPIC    Breast Cancer Screening:    Breast CA Risk Assessment (FHS-7) 2021   Do you have a family history of breast, colon, or ovarian cancer? No / Unknown         Mammogram Screening: Recommended mammography every 1-2 years with patient discussion and risk factor consideration  Pertinent mammograms are reviewed under the imaging tab.    History of abnormal Pap smear: Status post benign hysterectomy. Health Maintenance and Surgical History updated.  PAP / HPV 2007   PAP NIL NIL     Reviewed and updated as needed this visit by clinical staff  Tobacco  Allergies  Meds  Problems  Med Hx  Surg Hx  Fam Hx          Reviewed and updated as needed this visit by Provider  Tobacco  Allergies  Meds  Problems  Med Hx  Surg Hx  Fam Hx             Review of Systems   Constitutional: Negative for chills and fever.   HENT: Negative for congestion, ear pain, hearing loss and sore throat.    Eyes: Negative for pain and visual disturbance.   Respiratory: Negative for cough and shortness of breath.    Cardiovascular: Negative for chest pain, palpitations and peripheral edema.   Gastrointestinal: Negative for abdominal pain, constipation, diarrhea, heartburn, hematochezia and  "nausea.   Breasts:  Negative for tenderness, breast mass and discharge.   Genitourinary: Negative for dysuria, frequency, genital sores, hematuria, pelvic pain, urgency, vaginal bleeding and vaginal discharge.   Musculoskeletal: Positive for arthralgias. Negative for joint swelling and myalgias.   Skin: Negative for rash.   Neurological: Positive for paresthesias. Negative for dizziness, weakness and headaches.   Psychiatric/Behavioral: Positive for mood changes. The patient is not nervous/anxious.           OBJECTIVE:   /70   Pulse 60   Resp 10   Ht 1.6 m (5' 3\")   Wt 63.5 kg (140 lb)   LMP 06/14/2006   BMI 24.80 kg/m    Physical Exam  GENERAL APPEARANCE: healthy, alert and no distress  EYES: Eyes grossly normal to inspection, PERRL and conjunctivae and sclerae normal  HENT: ear canals and TM's normal, nose and mouth without ulcers or lesions, oropharynx clear and oral mucous membranes moist  NECK: no adenopathy, no asymmetry, masses, or scars and thyroid normal to palpation  RESP: lungs clear to auscultation - no rales, rhonchi or wheezes  BREAST: normal without masses, tenderness or nipple discharge and no palpable axillary masses or adenopathy  CV: regular rate and rhythm, normal S1 S2, no S3 or S4, no murmur, click or rub, no peripheral edema and peripheral pulses strong  ABDOMEN: soft, nontender, no hepatosplenomegaly, no masses and bowel sounds normal  MS: no musculoskeletal defects are noted and gait is age appropriate without ataxia  SKIN: no suspicious lesions or rashes  NEURO: Normal strength and tone, sensory exam grossly normal, mentation intact and speech normal  PSYCH: mentation appears normal and affect normal/bright    Diagnostic Test Results:  Labs reviewed in Epic    ASSESSMENT/PLAN:   1. Routine general medical examination at a health care facility    - MA SCREENING DIGITAL BILAT - Future  (s+30); Future  - Lipid panel reflex to direct LDL Fasting    2. Screen for colon cancer    - " "GASTROENTEROLOGY ADULT REF PROCEDURE ONLY; Future    3. Screening for hyperlipidemia      4. Major depressive disorder, recurrent episode, mild (H)  Stable no change in treatment plan.   - citalopram (CELEXA) 20 MG tablet; Take 1 tablet (20 mg) by mouth daily  Dispense: 90 tablet; Refill: 3  - traZODone (DESYREL) 50 MG tablet; Take 1 tablet (50 mg) by mouth At Bedtime  Dispense: 90 tablet; Refill: 3    5. Crohn's disease without complication, unspecified gastrointestinal tract location (H)  Stable no change in treatment plan.   - Comprehensive metabolic panel  - CBC with platelets differential  - cholestyramine light (PREVALITE) 4 GM powder; MIX ONE SCOOP (4 GRAMS) AND DRINK ONCE DAILY AS DIRECTED  Dispense: 239.4 g; Refill: 11    Patient has been advised of split billing requirements and indicates understanding: Yes  COUNSELING:  Reviewed preventive health counseling, as reflected in patient instructions    Estimated body mass index is 24.8 kg/m  as calculated from the following:    Height as of this encounter: 1.6 m (5' 3\").    Weight as of this encounter: 63.5 kg (140 lb).        She reports that she quit smoking about 27 years ago. Her smoking use included cigarettes. She has never used smokeless tobacco.      Counseling Resources:  ATP IV Guidelines  Pooled Cohorts Equation Calculator  Breast Cancer Risk Calculator  BRCA-Related Cancer Risk Assessment: FHS-7 Tool  FRAX Risk Assessment  ICSI Preventive Guidelines  Dietary Guidelines for Americans, 2010  [employer]'s MyPlate  ASA Prophylaxis  Lung CA Screening    Yaima Aguilar MD  [facility]  "

## 2021-05-28 NOTE — NURSING NOTE
"Chief Complaint   Patient presents with     Physical     /70   Pulse 60   Resp 10   Ht 1.6 m (5' 3\")   Wt 63.5 kg (140 lb)   LMP 06/14/2006   BMI 24.80 kg/m   Estimated body mass index is 24.8 kg/m  as calculated from the following:    Height as of this encounter: 1.6 m (5' 3\").    Weight as of this encounter: 63.5 kg (140 lb).  Patient presents to the clinic using No DME      Health Maintenance that is potentially due pending provider review:    Health Maintenance Due   Topic Date Due     ANNUAL REVIEW OF HM ORDERS  Never done     COLORECTAL CANCER SCREENING  Never done     HIV SCREENING  Never done     ZOSTER IMMUNIZATION (1 of 2) Never done     PHQ-9  03/24/2020     PREVENTIVE CARE VISIT  09/24/2020     MAMMO SCREENING  10/17/2020     LIPID  01/08/2021        Pended.        "

## 2021-05-28 NOTE — LETTER
May 28, 2021      Jenny Banks  [id number] [address] [place] [address]        Dear ,    We are writing to inform you of your test results.    Labs all look great   Let me know if questions.        If you have any questions or concerns, please call the clinic at the number listed above.     Sincerely,      Yaima Aguilar MD/ ketan    Resulted Orders   Lipid panel reflex to direct LDL Fasting   Result Value Ref Range    Cholesterol 202 (H) <200 mg/dL      Comment:      Desirable:       <200 mg/dl    Triglycerides 109 <150 mg/dL      Comment:      Non Fasting    HDL Cholesterol 76 >49 mg/dL    LDL Cholesterol Calculated 104 (H) <100 mg/dL      Comment:      Above desirable:  100-129 mg/dl  Borderline High:  130-159 mg/dL  High:             160-189 mg/dL  Very high:       >189 mg/dl      Non HDL Cholesterol 126 <130 mg/dL   Comprehensive metabolic panel   Result Value Ref Range    Sodium 139 133 - 144 mmol/L    Potassium 4.9 3.4 - 5.3 mmol/L    Chloride 107 94 - 109 mmol/L    Carbon Dioxide 31 20 - 32 mmol/L    Anion Gap 1 (L) 3 - 14 mmol/L    Glucose 96 70 - 99 mg/dL      Comment:      Non Fasting    Urea Nitrogen 14 7 - 30 mg/dL    Creatinine 0.82 0.52 - 1.04 mg/dL    GFR Estimate 76 >60 mL/min/[1.73_m2]      Comment:      Non  GFR Calc  Starting 12/18/2018, serum creatinine based estimated GFR (eGFR) will be   calculated using the Chronic Kidney Disease Epidemiology Collaboration   (CKD-EPI) equation.      GFR Estimate If Black 88 >60 mL/min/[1.73_m2]      Comment:       GFR Calc  Starting 12/18/2018, serum creatinine based estimated GFR (eGFR) will be   calculated using the Chronic Kidney Disease Epidemiology Collaboration   (CKD-EPI) equation.      Calcium 8.6 8.5 - 10.1 mg/dL    Bilirubin Total 0.5 0.2 - 1.3 mg/dL    Albumin 3.8 3.4 - 5.0 g/dL    Protein Total 7.2 6.8 - 8.8 g/dL    Alkaline Phosphatase 73 40 - 150 U/L    ALT 37 0 - 50 U/L    AST 26 0 - 45 U/L    CBC with platelets differential   Result Value Ref Range    WBC 4.1 4.0 - 11.0 10e9/L    RBC Count 4.54 3.8 - 5.2 10e12/L    Hemoglobin 13.6 11.7 - 15.7 g/dL    Hematocrit 40.0 35.0 - 47.0 %    MCV 88 78 - 100 fl    MCH 30.0 26.5 - 33.0 pg    MCHC 34.0 31.5 - 36.5 g/dL    RDW 12.8 10.0 - 15.0 %    Platelet Count 212 150 - 450 10e9/L    % Neutrophils 57.7 %    % Lymphocytes 25.7 %    % Monocytes 11.1 %    % Eosinophils 5.3 %    % Basophils 0.2 %    Absolute Neutrophil 2.4 1.6 - 8.3 10e9/L    Absolute Lymphocytes 1.1 0.8 - 5.3 10e9/L    Absolute Monocytes 0.5 0.0 - 1.3 10e9/L    Absolute Eosinophils 0.2 0.0 - 0.7 10e9/L    Absolute Basophils 0.0 0.0 - 0.2 10e9/L    Diff Method Automated Method

## 2021-05-29 ASSESSMENT — ANXIETY QUESTIONNAIRES: GAD7 TOTAL SCORE: 2

## 2021-09-12 ENCOUNTER — HEALTH MAINTENANCE LETTER (OUTPATIENT)
Age: 66
End: 2021-09-12

## 2022-02-01 ENCOUNTER — TELEPHONE (OUTPATIENT)
Dept: FAMILY MEDICINE | Facility: CLINIC | Age: 67
End: 2022-02-01
Payer: COMMERCIAL

## 2022-02-01 NOTE — TELEPHONE ENCOUNTER
Reason for Call:  Other prescription    Detailed comments: Patient is calling asking if she can get a Rx for the Cholestyramine Light in packets she is traveling and needs a 8 day supply of the packet form.  She said she has gotten this before.  Phone Number Patient can be reached at: Home number on file [phone] (home)    Best Time: any    Can we leave a detailed message on this number? YES    Call taken on 2/1/2022 at 8:40 AM by Sally Mejia

## 2022-02-01 NOTE — TELEPHONE ENCOUNTER
cholestyramine light (PREVALITE) 4 GM powder-   Mix one packet daily and dring qty 8 [facility] to  [facility].   LM informing pt.  MATEO Sanders RN

## 2022-02-05 DIAGNOSIS — K50.90 CROHN'S DISEASE WITHOUT COMPLICATION, UNSPECIFIED GASTROINTESTINAL TRACT LOCATION (H): ICD-10-CM

## 2022-02-07 RX ORDER — CHOLESTYRAMINE LIGHT 4 G/5.7G
POWDER, FOR SUSPENSION ORAL
Qty: 240 G | Refills: 0 | Status: SHIPPED | OUTPATIENT
Start: 2022-02-07 | End: 2022-02-18

## 2022-02-16 DIAGNOSIS — K50.90 CROHN'S DISEASE WITHOUT COMPLICATION, UNSPECIFIED GASTROINTESTINAL TRACT LOCATION (H): ICD-10-CM

## 2022-02-16 RX ORDER — CHOLESTYRAMINE LIGHT 4 G/5.7G
POWDER, FOR SUSPENSION ORAL
Qty: 240 G | Refills: 0 | OUTPATIENT
Start: 2022-02-16

## 2022-02-17 DIAGNOSIS — K50.90 CROHN'S DISEASE WITHOUT COMPLICATION, UNSPECIFIED GASTROINTESTINAL TRACT LOCATION (H): ICD-10-CM

## 2022-02-18 RX ORDER — CHOLESTYRAMINE LIGHT 4 G/5.7G
POWDER, FOR SUSPENSION ORAL
Qty: 240 G | Refills: 0 | Status: SHIPPED | OUTPATIENT
Start: 2022-02-18 | End: 2022-04-05

## 2022-04-04 DIAGNOSIS — K50.90 CROHN'S DISEASE WITHOUT COMPLICATION, UNSPECIFIED GASTROINTESTINAL TRACT LOCATION (H): ICD-10-CM

## 2022-04-05 RX ORDER — CHOLESTYRAMINE LIGHT 4 G/5.7G
POWDER, FOR SUSPENSION ORAL
Qty: 240 G | Refills: 0 | Status: SHIPPED | OUTPATIENT
Start: 2022-04-05 | End: 2022-07-08

## 2022-04-05 NOTE — TELEPHONE ENCOUNTER
Last Written Prescription Date:  02/18/22  Last Fill Quantity: 240 g,  # refills: 0   Last office visit: 5/28/2021 with prescribing provider:    Future Office Visit:    Cholesterol   Date Value Ref Range Status   05/28/2021 202 (H) <200 mg/dL Final     Comment:     Desirable:       <200 mg/dl     Routing refill request to provider for review/approval because:  Drug not on the [facility] refill protocol   Mckenzie MALONEY RN

## 2022-07-06 DIAGNOSIS — K50.90 CROHN'S DISEASE WITHOUT COMPLICATION, UNSPECIFIED GASTROINTESTINAL TRACT LOCATION (H): ICD-10-CM

## 2022-07-06 DIAGNOSIS — F33.0 MAJOR DEPRESSIVE DISORDER, RECURRENT EPISODE, MILD (H): ICD-10-CM

## 2022-07-08 RX ORDER — CITALOPRAM HYDROBROMIDE 20 MG/1
TABLET ORAL
Qty: 90 TABLET | Refills: 0 | Status: SHIPPED | OUTPATIENT
Start: 2022-07-08 | End: 2022-08-30

## 2022-07-08 RX ORDER — CHOLESTYRAMINE LIGHT 4 G/5.7G
POWDER, FOR SUSPENSION ORAL
Qty: 231 G | Refills: 0 | Status: SHIPPED | OUTPATIENT
Start: 2022-07-08 | End: 2022-08-30

## 2022-07-08 NOTE — TELEPHONE ENCOUNTER
8/30/22 Dr. Aguilar Appt scheduled. Requesting to have meds filled until appt.  Nhung [facility]

## 2022-07-08 NOTE — TELEPHONE ENCOUNTER
LM on identified VM due for appt, please call care team to schedule. Can refill meds until appt.  MATEO Sanders RN

## 2022-08-14 ENCOUNTER — HEALTH MAINTENANCE LETTER (OUTPATIENT)
Age: 67
End: 2022-08-14

## 2022-08-30 ENCOUNTER — OFFICE VISIT (OUTPATIENT)
Dept: FAMILY MEDICINE | Facility: CLINIC | Age: 67
End: 2022-08-30
Payer: COMMERCIAL

## 2022-08-30 VITALS
DIASTOLIC BLOOD PRESSURE: 80 MMHG | RESPIRATION RATE: 12 BRPM | WEIGHT: 152 LBS | TEMPERATURE: 97 F | OXYGEN SATURATION: 98 % | BODY MASS INDEX: 26.93 KG/M2 | HEIGHT: 63 IN | SYSTOLIC BLOOD PRESSURE: 110 MMHG | HEART RATE: 61 BPM

## 2022-08-30 DIAGNOSIS — F33.0 MAJOR DEPRESSIVE DISORDER, RECURRENT EPISODE, MILD (H): ICD-10-CM

## 2022-08-30 DIAGNOSIS — Z00.00 ROUTINE GENERAL MEDICAL EXAMINATION AT A HEALTH CARE FACILITY: Primary | ICD-10-CM

## 2022-08-30 DIAGNOSIS — K50.90 CROHN'S DISEASE WITHOUT COMPLICATION, UNSPECIFIED GASTROINTESTINAL TRACT LOCATION (H): ICD-10-CM

## 2022-08-30 DIAGNOSIS — Z12.11 SCREEN FOR COLON CANCER: ICD-10-CM

## 2022-08-30 PROCEDURE — 96127 BRIEF EMOTIONAL/BEHAV ASSMT: CPT | Performed by: FAMILY MEDICINE

## 2022-08-30 PROCEDURE — 99396 PREV VISIT EST AGE 40-64: CPT | Performed by: FAMILY MEDICINE

## 2022-08-30 PROCEDURE — 99214 OFFICE O/P EST MOD 30 MIN: CPT | Mod: 25 | Performed by: FAMILY MEDICINE

## 2022-08-30 RX ORDER — TRAZODONE HYDROCHLORIDE 50 MG/1
50 TABLET, FILM COATED ORAL AT BEDTIME
Qty: 90 TABLET | Refills: 3 | Status: SHIPPED | OUTPATIENT
Start: 2022-08-30 | End: 2022-10-03

## 2022-08-30 RX ORDER — CHOLESTYRAMINE LIGHT 4 G/5.7G
POWDER, FOR SUSPENSION ORAL
Qty: 231 G | Refills: 11 | Status: SHIPPED | OUTPATIENT
Start: 2022-08-30 | End: 2022-10-03

## 2022-08-30 RX ORDER — CITALOPRAM HYDROBROMIDE 20 MG/1
20 TABLET ORAL DAILY
Qty: 90 TABLET | Refills: 3 | Status: SHIPPED | OUTPATIENT
Start: 2022-08-30 | End: 2022-10-03

## 2022-08-30 RX ORDER — CETIRIZINE HYDROCHLORIDE 10 MG/1
10 TABLET ORAL DAILY
COMMUNITY

## 2022-08-30 RX ORDER — BIOTIN 10000 MCG
CAPSULE ORAL
COMMUNITY
End: 2023-11-16

## 2022-08-30 ASSESSMENT — ENCOUNTER SYMPTOMS
HEADACHES: 0
SORE THROAT: 0
DIZZINESS: 0
FREQUENCY: 1
SHORTNESS OF BREATH: 0
BREAST MASS: 0
NERVOUS/ANXIOUS: 0
DYSURIA: 0
NAUSEA: 0
MYALGIAS: 0
ABDOMINAL PAIN: 0
EYE PAIN: 0
PARESTHESIAS: 1
HEMATURIA: 0
PALPITATIONS: 0
ARTHRALGIAS: 1
COUGH: 0
HEMATOCHEZIA: 0
WEAKNESS: 0
CHILLS: 0
CONSTIPATION: 0
HEARTBURN: 0
FEVER: 0
DIARRHEA: 1
JOINT SWELLING: 1

## 2022-08-30 ASSESSMENT — ANXIETY QUESTIONNAIRES
7. FEELING AFRAID AS IF SOMETHING AWFUL MIGHT HAPPEN: NOT AT ALL
GAD7 TOTAL SCORE: 1
7. FEELING AFRAID AS IF SOMETHING AWFUL MIGHT HAPPEN: NOT AT ALL
4. TROUBLE RELAXING: NOT AT ALL
GAD7 TOTAL SCORE: 1
5. BEING SO RESTLESS THAT IT IS HARD TO SIT STILL: NOT AT ALL
3. WORRYING TOO MUCH ABOUT DIFFERENT THINGS: NOT AT ALL
6. BECOMING EASILY ANNOYED OR IRRITABLE: NOT AT ALL
GAD7 TOTAL SCORE: 1
2. NOT BEING ABLE TO STOP OR CONTROL WORRYING: NOT AT ALL
IF YOU CHECKED OFF ANY PROBLEMS ON THIS QUESTIONNAIRE, HOW DIFFICULT HAVE THESE PROBLEMS MADE IT FOR YOU TO DO YOUR WORK, TAKE CARE OF THINGS AT HOME, OR GET ALONG WITH OTHER PEOPLE: NOT DIFFICULT AT ALL
1. FEELING NERVOUS, ANXIOUS, OR ON EDGE: SEVERAL DAYS
8. IF YOU CHECKED OFF ANY PROBLEMS, HOW DIFFICULT HAVE THESE MADE IT FOR YOU TO DO YOUR WORK, TAKE CARE OF THINGS AT HOME, OR GET ALONG WITH OTHER PEOPLE?: NOT DIFFICULT AT ALL

## 2022-08-30 ASSESSMENT — PAIN SCALES - GENERAL: PAINLEVEL: NO PAIN (0)

## 2022-08-30 ASSESSMENT — PATIENT HEALTH QUESTIONNAIRE - PHQ9
SUM OF ALL RESPONSES TO PHQ QUESTIONS 1-9: 3
SUM OF ALL RESPONSES TO PHQ QUESTIONS 1-9: 3
10. IF YOU CHECKED OFF ANY PROBLEMS, HOW DIFFICULT HAVE THESE PROBLEMS MADE IT FOR YOU TO DO YOUR WORK, TAKE CARE OF THINGS AT HOME, OR GET ALONG WITH OTHER PEOPLE: NOT DIFFICULT AT ALL

## 2022-08-30 NOTE — NURSING NOTE
"Chief Complaint   Patient presents with     Physical     /80   Pulse 61   Temp 97  F (36.1  C) (Tympanic)   Resp 12   Ht 1.588 m (5' 2.5\")   Wt 68.9 kg (152 lb)   LMP 06/14/2006   SpO2 98%   BMI 27.36 kg/m   Estimated body mass index is 27.36 kg/m  as calculated from the following:    Height as of this encounter: 1.588 m (5' 2.5\").    Weight as of this encounter: 68.9 kg (152 lb).  Patient presents to the clinic using No DME      Health Maintenance that is potentially due pending provider review:    Health Maintenance Due   Topic Date Due     ANNUAL REVIEW OF HM ORDERS  Never done     COLORECTAL CANCER SCREENING  Never done     HIV SCREENING  Never done     ZOSTER IMMUNIZATION (1 of 2) Never done     MAMMO SCREENING  10/17/2020     COVID-19 Vaccine (4 - Booster for Moderna series) 04/15/2022     PREVENTIVE CARE VISIT  05/28/2022     INFLUENZA VACCINE (1) 09/01/2022        Scheduled mammogram.        "

## 2022-08-30 NOTE — PROGRESS NOTES
SUBJECTIVE:   CC: Jenny Banks is an 66 year old woman who presents for preventive health visit.       Patient has been advised of split billing requirements and indicates understanding: Yes  Healthy Habits:     Getting at least 3 servings of Calcium per day:  Yes    Bi-annual eye exam:  Yes    Dental care twice a year:  Yes    Sleep apnea or symptoms of sleep apnea:  None    Diet:  Carbohydrate counting and Gluten-free/reduced    Frequency of exercise:  2-3 days/week    Taking medications regularly:  Yes    Medication side effects:  Not applicable    PHQ-2 Total Score: 0    Additional concerns today:  Yes      Depression Followup    How are you doing with your depression since your last visit? No change    Are you having other symptoms that might be associated with depression? No    Have you had a significant life event?  No     Are you feeling anxious or having panic attacks?   No    Do you have any concerns with your use of alcohol or other drugs? No    Social History     Tobacco Use     Smoking status: Former Smoker     Types: Cigarettes     Quit date: 5/10/1994     Years since quittin.3     Smokeless tobacco: Never Used     Tobacco comment: quit    Substance Use Topics     Alcohol use: No     Drug use: No     PHQ 2019   PHQ-9 Total Score 0 3 3   Q9: Thoughts of better off dead/self-harm past 2 weeks Not at all Not at all Not at all     LUCIANA-7 SCORE 2019   Total Score - - -   Total Score 0 (minimal anxiety) - 1 (minimal anxiety)   Total Score 0 2 1         Suicide Assessment Five-step Evaluation and Treatment (SAFE-T)    CROHN's    She is sx freee with meds not   She needs colonoscopy and has put this off       Today's PHQ-2 Score:   PHQ-2 (  [employer]) 2022   Q1: Little interest or pleasure in doing things 0   Q2: Feeling down, depressed or hopeless 0   PHQ-2 Score 0   PHQ-2 Total Score (12-17 Years)- Positive if 3 or more points;  Administer PHQ-A if positive -   Q1: Little interest or pleasure in doing things Not at all   Q2: Feeling down, depressed or hopeless Not at all   PHQ-2 Score 0       Abuse: Current or Past (Physical, Sexual or Emotional) - No  Do you feel safe in your environment? Yes        Social History     Tobacco Use     Smoking status: Former Smoker     Types: Cigarettes     Quit date: 5/10/1994     Years since quittin.3     Smokeless tobacco: Never Used     Tobacco comment: quit    Substance Use Topics     Alcohol use: No     If you drink alcohol do you typically have >3 drinks per day or >7 drinks per week? No    Alcohol Use 2022   Prescreen: >3 drinks/day or >7 drinks/week? No   Prescreen: >3 drinks/day or >7 drinks/week? -       Reviewed orders with patient.  Reviewed health maintenance and updated orders accordingly - Yes  Labs reviewed in EPIC    Breast Cancer Screening:    Breast CA Risk Assessment (FHS-7) 2021   Do you have a family history of breast, colon, or ovarian cancer? No / Unknown         Mammogram Screening: Recommended mammography every 1-2 years with patient discussion and risk factor consideration  Pertinent mammograms are reviewed under the imaging tab.    History of abnormal Pap smear: Status post benign hysterectomy. Health Maintenance and Surgical History updated.  PAP / HPV 2007   PAP (Historical) NIL NIL     Reviewed and updated as needed this visit by clinical staff   Tobacco  Allergies  Meds  Problems  Med Hx  Surg Hx  Fam Hx            Reviewed and updated as needed this visit by Provider   Tobacco  Allergies  Meds  Problems  Med Hx  Surg Hx  Fam Hx               Review of Systems   Constitutional: Negative for chills and fever.   HENT: Negative for congestion, ear pain, hearing loss and sore throat.    Eyes: Negative for pain and visual disturbance.   Respiratory: Negative for cough and shortness of breath.    Cardiovascular: Negative for chest  "pain, palpitations and peripheral edema.   Gastrointestinal: Positive for diarrhea. Negative for abdominal pain, constipation, heartburn, hematochezia and nausea.   Breasts:  Negative for tenderness, breast mass and discharge.   Genitourinary: Positive for frequency. Negative for dysuria, genital sores, hematuria, pelvic pain, urgency, vaginal bleeding and vaginal discharge.   Musculoskeletal: Positive for arthralgias and joint swelling. Negative for myalgias.   Skin: Positive for rash.   Neurological: Positive for paresthesias. Negative for dizziness, weakness and headaches.   Psychiatric/Behavioral: Negative for mood changes. The patient is not nervous/anxious.      See hpi sxs are stable for crohn's     OBJECTIVE:   /80   Pulse 61   Temp 97  F (36.1  C) (Tympanic)   Resp 12   Ht 1.588 m (5' 2.5\")   Wt 68.9 kg (152 lb)   LMP 06/14/2006   SpO2 98%   BMI 27.36 kg/m    Physical Exam  GENERAL APPEARANCE: healthy, alert and no distress  EYES: Eyes grossly normal to inspection, PERRL and conjunctivae and sclerae normal  HENT: ear canals and TM's normal, nose and mouth without ulcers or lesions, oropharynx clear and oral mucous membranes moist  NECK: no adenopathy, no asymmetry, masses, or scars and thyroid normal to palpation  RESP: lungs clear to auscultation - no rales, rhonchi or wheezes  BREAST: normal without masses, tenderness or nipple discharge and no palpable axillary masses or adenopathy  CV: regular rate and rhythm, normal S1 S2, no S3 or S4, no murmur, click or rub, no peripheral edema and peripheral pulses strong  ABDOMEN: soft, nontender, no hepatosplenomegaly, no masses and bowel sounds normal  MS: no musculoskeletal defects are noted and gait is age appropriate without ataxia  SKIN: no suspicious lesions or rashes  NEURO: Normal strength and tone, sensory exam grossly normal, mentation intact and speech normal  PSYCH: mentation appears normal and affect normal/bright    Diagnostic Test " "Results:  Labs reviewed in Epic    ASSESSMENT/PLAN:   (Z00.00) Routine general medical examination at a health care facility  (primary encounter diagnosis)  Comment:   Plan:     (K50.90) Crohn's disease without complication, unspecified gastrointestinal tract location (H)  Comment: Stable no change in treatment plan.   Plan: cholestyramine light (PREVALITE) 4 GM powder            (F33.0) Major depressive disorder, recurrent episode, mild (H)  Comment: Stable no change in treatment plan.   Plan: citalopram (CELEXA) 20 MG tablet, traZODone         (DESYREL) 50 MG tablet            (Z12.11) Screen for colon cancer  Comment:   Plan: Colonoscopy Screening  Referral              Patient has been advised of split billing requirements and indicates understanding: Yes    COUNSELING:  Reviewed preventive health counseling, as reflected in patient instructions    Estimated body mass index is 27.36 kg/m  as calculated from the following:    Height as of this encounter: 1.588 m (5' 2.5\").    Weight as of this encounter: 68.9 kg (152 lb).        She reports that she quit smoking about 28 years ago. Her smoking use included cigarettes. She has never used smokeless tobacco.      Counseling Resources:  ATP IV Guidelines  Pooled Cohorts Equation Calculator  Breast Cancer Risk Calculator  BRCA-Related Cancer Risk Assessment: FHS-7 Tool  FRAX Risk Assessment  ICSI Preventive Guidelines  Dietary Guidelines for Americans, 2010  [employer]DA's MyPlate  ASA Prophylaxis  Lung CA Screening    Yaima Aguilar MD  [facility]  "

## 2022-09-30 DIAGNOSIS — F33.0 MAJOR DEPRESSIVE DISORDER, RECURRENT EPISODE, MILD (H): ICD-10-CM

## 2022-09-30 DIAGNOSIS — K50.90 CROHN'S DISEASE WITHOUT COMPLICATION, UNSPECIFIED GASTROINTESTINAL TRACT LOCATION (H): ICD-10-CM

## 2022-10-03 RX ORDER — CHOLESTYRAMINE LIGHT 4 G/5.7G
POWDER, FOR SUSPENSION ORAL
Qty: 231 G | Refills: 11 | Status: SHIPPED | OUTPATIENT
Start: 2022-10-03 | End: 2023-09-20

## 2022-10-03 RX ORDER — CITALOPRAM HYDROBROMIDE 20 MG/1
20 TABLET ORAL DAILY
Qty: 90 TABLET | Refills: 3 | Status: SHIPPED | OUTPATIENT
Start: 2022-10-03 | End: 2023-10-27

## 2022-10-03 RX ORDER — TRAZODONE HYDROCHLORIDE 50 MG/1
50 TABLET, FILM COATED ORAL AT BEDTIME
Qty: 90 TABLET | Refills: 3 | Status: SHIPPED | OUTPATIENT
Start: 2022-10-03 | End: 2023-10-18

## 2022-10-12 ENCOUNTER — ANCILLARY PROCEDURE (OUTPATIENT)
Dept: MAMMOGRAPHY | Facility: CLINIC | Age: 67
End: 2022-10-12
Payer: COMMERCIAL

## 2022-10-12 DIAGNOSIS — Z12.31 VISIT FOR SCREENING MAMMOGRAM: ICD-10-CM

## 2022-10-12 PROCEDURE — 77067 SCR MAMMO BI INCL CAD: CPT | Mod: TC | Performed by: RADIOLOGY

## 2022-10-12 PROCEDURE — 77063 BREAST TOMOSYNTHESIS BI: CPT | Mod: TC | Performed by: RADIOLOGY

## 2022-10-14 ENCOUNTER — TELEPHONE (OUTPATIENT)
Dept: FAMILY MEDICINE | Facility: CLINIC | Age: 67
End: 2022-10-14

## 2022-10-14 NOTE — TELEPHONE ENCOUNTER
General Call      Reason for Call:  Prior auth - need for clarification    What are your questions or concerns:  Cinthia from [facility] calling in regards to a prior auth for Prevalite powder. Needing the pt's diagnosis code for need of this medication. Would like a call back from care team to discuss.       Okay to leave a detailed message?: Yes at Other phone number:  [phone]  Cinthia - Sathish Linda

## 2022-11-19 ENCOUNTER — HEALTH MAINTENANCE LETTER (OUTPATIENT)
Age: 67
End: 2022-11-19

## 2023-01-12 ENCOUNTER — TELEPHONE (OUTPATIENT)
Dept: FAMILY MEDICINE | Facility: CLINIC | Age: 68
End: 2023-01-12

## 2023-01-12 NOTE — TELEPHONE ENCOUNTER
Reason for Call:  Other prescription    Detailed comments: Pt is requesting her Cholestyramine Light switched to something cheaper. Pt said the medication continues to go up in [place].  Did inform Pt she should contact pharmacy and her insurance what would be cheaper and why it is going up on cost. Pt had not discussed with either sources  Please Advise.    Phone Number Patient can be reached at: Home number on file [phone] (home)    Best Time: Any Time      Can we leave a detailed message on this number? YES    Call taken on 1/12/2023 at 8:36 AM by Nhung Sandoval

## 2023-01-12 NOTE — TELEPHONE ENCOUNTER
Pt advised as noted per MD. States she has contacted insurance and pharm, no cheaper alternative and will continue Rx. May investigate Rx assistance, coupons.  MATEO Sanders RN

## 2023-02-22 ENCOUNTER — TELEPHONE (OUTPATIENT)
Dept: FAMILY MEDICINE | Facility: CLINIC | Age: 68
End: 2023-02-22
Payer: COMMERCIAL

## 2023-02-22 NOTE — TELEPHONE ENCOUNTER
Patient calling wanting to leave a message for Dr. Aguilar. Patient is considering switching to [employer]. States her  has already switched.     Wanted to let Dr. Aguilar know that she received a bill for $260 from her annual visit with Dr. Aguilar. States this happened because she answered the questions on the tablet. Patient has dispute the bill and has made several calls between her insurance and [facility] billing department. Was told the visit was coded correctly and bill can not be changed. Patient wanting to know Dr. Rain thoughts.

## 2023-02-23 NOTE — TELEPHONE ENCOUNTER
Spoke with patient and due to a misunderstanding during the visit and the pt felling misled the outstanding balance will be removed from the pts account.  Nancy Wade, Clinic Manager

## 2023-07-05 ENCOUNTER — TELEPHONE (OUTPATIENT)
Dept: FAMILY MEDICINE | Facility: CLINIC | Age: 68
End: 2023-07-05
Payer: COMMERCIAL

## 2023-07-05 DIAGNOSIS — K50.90 CROHN'S DISEASE WITHOUT COMPLICATION, UNSPECIFIED GASTROINTESTINAL TRACT LOCATION (H): Primary | ICD-10-CM

## 2023-07-05 NOTE — TELEPHONE ENCOUNTER
[facility] calling. Patient is scheduled for 7/18/23. Needing a diagnostic Colonoscopy referral placed for her appointment.     Referral can be faxed to [phone]

## 2023-07-06 NOTE — TELEPHONE ENCOUNTER
Patient returning call. [facility] Dr. Aguilar has told her for years to have this done. She does have a referral to be seen with in [place]. Needs referral to have procedure done with [facility].

## 2023-07-12 ENCOUNTER — TELEPHONE (OUTPATIENT)
Dept: FAMILY MEDICINE | Facility: CLINIC | Age: 68
End: 2023-07-12
Payer: COMMERCIAL

## 2023-07-12 NOTE — TELEPHONE ENCOUNTER
Sarah with [facility] did not receive faxed referral. Wanted me to verify referral with her and take verbal order. Went over the referral with her over the phone and pt has appt 7/18      Jay Fuentes RN

## 2023-07-18 ENCOUNTER — TRANSFERRED RECORDS (OUTPATIENT)
Dept: HEALTH INFORMATION MANAGEMENT | Facility: CLINIC | Age: 68
End: 2023-07-18
Payer: COMMERCIAL

## 2023-07-31 ENCOUNTER — PATIENT OUTREACH (OUTPATIENT)
Dept: CARE COORDINATION | Facility: CLINIC | Age: 68
End: 2023-07-31
Payer: COMMERCIAL

## 2023-08-02 ENCOUNTER — TELEPHONE (OUTPATIENT)
Dept: FAMILY MEDICINE | Facility: CLINIC | Age: 68
End: 2023-08-02
Payer: COMMERCIAL

## 2023-08-02 DIAGNOSIS — K50.90 CROHN'S DISEASE WITHOUT COMPLICATION, UNSPECIFIED GASTROINTESTINAL TRACT LOCATION (H): Primary | ICD-10-CM

## 2023-08-02 NOTE — TELEPHONE ENCOUNTER
Test Results    Contacts         Type Contact Phone/Fax    08/02/2023 02:18 PM CDT Phone (Incoming) Jenny Banks (Self) [phone] (H)            Who ordered the test:  Jeff    Type of test: 7/18/23 had colonoscopy, has pain LL side abdomen, per [facility] told probably need a CT.  Can Dr. Aguilar look at the results and let her know.    Date of test:  07/18/23    Where was the test performed:  [facility], [facility]    What are your questions/concerns?:  pain, painful when moving    Could we send this information to you in [employer] or would you prefer to receive a phone call?:   Patient would prefer a phone call   Okay to leave a detailed message?: Yes at Cell number on file:    Telephone Information:   Mobile [phone]     Rivka De La Rosa  [facility]

## 2023-08-14 ENCOUNTER — PATIENT OUTREACH (OUTPATIENT)
Dept: CARE COORDINATION | Facility: CLINIC | Age: 68
End: 2023-08-14
Payer: COMMERCIAL

## 2023-09-20 DIAGNOSIS — K50.90 CROHN'S DISEASE WITHOUT COMPLICATION, UNSPECIFIED GASTROINTESTINAL TRACT LOCATION (H): ICD-10-CM

## 2023-09-20 RX ORDER — COLESTIPOL HYDROCHLORIDE 5 G/5G
1 GRANULE, FOR SUSPENSION ORAL DAILY
Qty: 30 EACH | Refills: 0 | Status: SHIPPED | OUTPATIENT
Start: 2023-09-20 | End: 2024-05-03

## 2023-09-20 NOTE — TELEPHONE ENCOUNTER
Patient called. Received letter from her insurance company regarding her RX coverage.     Letter was advising patient to discuss switching from Cholestyramine Light (Prevalite) 4 GM powder to Colestipol. Patient states it will save her a ton of money on each refill.     Wondering if Dr. Aguilar can switch the medication?     Preferred Pharmacy:    [facility] [address] [place] [address] [address] [facility]  [address] [place] [id number]  Phone: [phone] Fax: [phone]      Could we send this information to you in [employer] or would you prefer to receive a phone call?:   Patient would prefer a phone call   Okay to leave a detailed message?: Yes at Home number on file [phone] (home)

## 2023-10-18 DIAGNOSIS — F33.0 MAJOR DEPRESSIVE DISORDER, RECURRENT EPISODE, MILD (H): ICD-10-CM

## 2023-10-18 RX ORDER — TRAZODONE HYDROCHLORIDE 50 MG/1
TABLET, FILM COATED ORAL
Qty: 30 TABLET | Refills: 0 | Status: SHIPPED | OUTPATIENT
Start: 2023-10-18 | End: 2023-11-16

## 2023-10-26 DIAGNOSIS — F33.0 MAJOR DEPRESSIVE DISORDER, RECURRENT EPISODE, MILD (H): ICD-10-CM

## 2023-10-27 RX ORDER — CITALOPRAM HYDROBROMIDE 20 MG/1
20 TABLET ORAL DAILY
Qty: 90 TABLET | Refills: 0 | Status: SHIPPED | OUTPATIENT
Start: 2023-10-27 | End: 2023-11-16

## 2023-10-27 NOTE — TELEPHONE ENCOUNTER
Needs updated PHQ-9, [employer] message sent.    Oly Seymour RN  [facility]

## 2023-11-02 ENCOUNTER — TRANSFERRED RECORDS (OUTPATIENT)
Dept: HEALTH INFORMATION MANAGEMENT | Facility: CLINIC | Age: 68
End: 2023-11-02
Payer: COMMERCIAL

## 2023-11-02 LAB
ALT SERPL-CCNC: 37 IU/L (ref 0–32)
AST SERPL-CCNC: 41 IU/L (ref 0–40)
CREATININE (EXTERNAL): 0.92 MG/DL (ref 0.57–1)
GFR ESTIMATED (EXTERNAL): 69 ML/MIN/1.73
GLUCOSE (EXTERNAL): 102 MG/DL (ref 70–99)
POTASSIUM (EXTERNAL): 5.3 MMOL/L (ref 3.5–5.2)

## 2023-11-08 DIAGNOSIS — R89.9 ABNORMAL LABORATORY TEST: Primary | ICD-10-CM

## 2023-11-13 ASSESSMENT — ENCOUNTER SYMPTOMS
HEMATURIA: 0
DIZZINESS: 0
FREQUENCY: 0
SORE THROAT: 0
CONSTIPATION: 0
ABDOMINAL PAIN: 1
WEAKNESS: 0
CHILLS: 0
DIARRHEA: 1
HEADACHES: 0
DYSURIA: 0
JOINT SWELLING: 0
HEARTBURN: 0
NAUSEA: 0
NERVOUS/ANXIOUS: 0
BREAST MASS: 0
PARESTHESIAS: 0
ARTHRALGIAS: 0
FEVER: 0
SHORTNESS OF BREATH: 0
COUGH: 0
HEMATOCHEZIA: 0
MYALGIAS: 0
EYE PAIN: 0
PALPITATIONS: 0

## 2023-11-13 ASSESSMENT — ACTIVITIES OF DAILY LIVING (ADL): CURRENT_FUNCTION: NO ASSISTANCE NEEDED

## 2023-11-16 ENCOUNTER — OFFICE VISIT (OUTPATIENT)
Dept: FAMILY MEDICINE | Facility: CLINIC | Age: 68
End: 2023-11-16
Payer: COMMERCIAL

## 2023-11-16 VITALS
DIASTOLIC BLOOD PRESSURE: 74 MMHG | BODY MASS INDEX: 26.4 KG/M2 | RESPIRATION RATE: 12 BRPM | TEMPERATURE: 97 F | WEIGHT: 149 LBS | HEIGHT: 63 IN | SYSTOLIC BLOOD PRESSURE: 112 MMHG | OXYGEN SATURATION: 96 % | HEART RATE: 64 BPM

## 2023-11-16 DIAGNOSIS — Z00.00 ENCOUNTER FOR MEDICARE ANNUAL WELLNESS EXAM: Primary | ICD-10-CM

## 2023-11-16 DIAGNOSIS — F33.0 MAJOR DEPRESSIVE DISORDER, RECURRENT EPISODE, MILD (H): ICD-10-CM

## 2023-11-16 DIAGNOSIS — Z78.0 POSTMENOPAUSAL STATUS: ICD-10-CM

## 2023-11-16 DIAGNOSIS — K50.90 CROHN'S DISEASE WITHOUT COMPLICATION, UNSPECIFIED GASTROINTESTINAL TRACT LOCATION (H): ICD-10-CM

## 2023-11-16 PROCEDURE — 99214 OFFICE O/P EST MOD 30 MIN: CPT | Mod: 25 | Performed by: FAMILY MEDICINE

## 2023-11-16 PROCEDURE — G0402 INITIAL PREVENTIVE EXAM: HCPCS | Performed by: FAMILY MEDICINE

## 2023-11-16 RX ORDER — RESPIRATORY SYNCYTIAL VIRUS VACCINE 120MCG/0.5
0.5 KIT INTRAMUSCULAR ONCE
Qty: 1 EACH | Refills: 0 | Status: CANCELLED | OUTPATIENT
Start: 2023-11-16 | End: 2023-11-16

## 2023-11-16 RX ORDER — CITALOPRAM HYDROBROMIDE 20 MG/1
20 TABLET ORAL DAILY
Qty: 90 TABLET | Refills: 3 | Status: SHIPPED | OUTPATIENT
Start: 2023-11-16 | End: 2024-05-03

## 2023-11-16 RX ORDER — CHOLESTYRAMINE LIGHT 4 G/5.7G
4 POWDER, FOR SUSPENSION ORAL DAILY
Qty: 231 G | Refills: 11 | Status: SHIPPED | OUTPATIENT
Start: 2023-11-16 | End: 2024-05-03

## 2023-11-16 RX ORDER — TRAZODONE HYDROCHLORIDE 50 MG/1
TABLET, FILM COATED ORAL
Qty: 30 TABLET | Refills: 3 | Status: SHIPPED | OUTPATIENT
Start: 2023-11-16 | End: 2024-03-18

## 2023-11-16 ASSESSMENT — ENCOUNTER SYMPTOMS
HEARTBURN: 0
HEMATOCHEZIA: 0
PARESTHESIAS: 0
DYSURIA: 0
BREAST MASS: 0
CONSTIPATION: 0
ABDOMINAL PAIN: 1
SORE THROAT: 0
HEMATURIA: 0
FREQUENCY: 0
COUGH: 0
NAUSEA: 0
JOINT SWELLING: 0
NERVOUS/ANXIOUS: 0
CHILLS: 0
HEADACHES: 0
SHORTNESS OF BREATH: 0
DIARRHEA: 1
PALPITATIONS: 0
DIZZINESS: 0
FEVER: 0
WEAKNESS: 0
MYALGIAS: 0
EYE PAIN: 0
ARTHRALGIAS: 0

## 2023-11-16 ASSESSMENT — ACTIVITIES OF DAILY LIVING (ADL): CURRENT_FUNCTION: NO ASSISTANCE NEEDED

## 2023-11-16 ASSESSMENT — PAIN SCALES - GENERAL: PAINLEVEL: NO PAIN (0)

## 2023-11-16 NOTE — PATIENT INSTRUCTIONS
Patient Education   Personalized Prevention Plan  You are due for the preventive services outlined below.  Your care team is available to assist you in scheduling these services.  If you have already completed any of these items, please share that information with your care team to update in your medical record.  Health Maintenance Due   Topic Date Due     Osteoporosis Screening  Never done     ANNUAL REVIEW OF HM ORDERS  Never done     Zoster (Shingles) Vaccine (1 of 2) Never done     LUNG CANCER SCREENING  Never done     RSV VACCINE (Pregnancy & 60+) (1 - 1-dose 60+ series) Never done     Pneumococcal Vaccine (1 - PCV) Never done     Annual Wellness Visit  08/30/2023     Flu Vaccine (1) 09/01/2023     COVID-19 Vaccine (4 - 2023-24 season) 09/01/2023     Your Health Risk Assessment indicates you feel you are not in good health    A healthy lifestyle helps keep the body fit and the mind alert. It helps protect you from disease, helps you fight disease, and helps prevent chronic disease (disease that doesn't go away) from getting worse. This is important as you get older and begin to notice twinges in muscles and joints and a decline in the strength and stamina you once took for granted. A healthy lifestyle includes good healthcare, good nutrition, weight control, recreation, and regular exercise. Avoid harmful substances and do what you can to keep safe. Another part of a healthy lifestyle is stay mentally active and socially involved.    Good healthcare   Have a wellness visit every year.   If you have new symptoms, let us know right away. Don't wait until the next checkup.   Take medicines exactly as prescribed and keep your medicines in a safe place. Tell us if your medicine causes problems.   Healthy diet and weight control   Eat 3 or 4 small, nutritious, low-fat, high-fiber meals a day. Include a variety of fruits, vegetables, and whole-grain foods.   Make sure you get enough calcium in your diet. Calcium,  vitamin D, and exercise help prevent osteoporosis (bone thinning).   If you live alone, try eating with others when you can. That way you get a good meal and have company while you eat it.   Try to keep a healthy weight. If you eat more calories than your body uses for energy, it will be stored as fat and you will gain weight.     Recreation   Recreation is not limited to sports and team events. It includes any activity that provides relaxation, interest, enjoyment, and exercise. Recreation provides an outlet for physical, mental, and social energy. It can give a sense of worth and achievement. It can help you stay healthy.    Mental Exercise and Social Involvement  Mental and emotional health is as important as physical health. Keep in touch with friends and family. Stay as active as possible. Continue to learn and challenge yourself.   Things you can do to stay mentally active are:  Learn something new, like a foreign language or musical instrument.   Play SCRABBLE or do crossword puzzles. If you cannot find people to play these games with you at home, you can play them with others on your computer through the Internet.   Join a games club--anything from card games to chess or checkers or lawn bowling.   Start a new hobby.   Go back to school.   Volunteer.   Read.   Keep up with world events.  Hearing Loss: Care Instructions  Overview     Hearing loss is a sudden or slow decrease in how well you hear. It can range from slight to profound. Permanent hearing loss can occur with aging. It also can happen when you are exposed long-term to loud noise. Examples include listening to loud music, riding motorcycles, or being around other loud machines.  Hearing loss can affect your work and home life. It can make you feel lonely or depressed. You may feel that you have lost your independence. But hearing aids and other devices can help you hear better and feel connected to others.  Follow-up care is a key part of your  treatment and safety. Be sure to make and go to all appointments, and call your doctor if you are having problems. It's also a good idea to know your test results and keep a list of the medicines you take.  How can you care for yourself at home?  Avoid loud noises whenever possible. This helps keep your hearing from getting worse.  Always wear hearing protection around loud noises.  Wear a hearing aid as directed.  A professional can help you pick a hearing aid that will work best for you.  You can also get hearing aids over the counter for mild to moderate hearing loss.  Have hearing tests as your doctor suggests. They can show whether your hearing has changed. Your hearing aid may need to be adjusted.  Use other devices as needed. These may include:  Telephone amplifiers and hearing aids that can connect to a television, stereo, radio, or microphone.  Devices that use lights or vibrations. These alert you to the doorbell, a ringing telephone, or a baby monitor.  Television closed-captioning. This shows the words at the bottom of the screen. Most new TVs can do this.  TTY (text telephone). This lets you type messages back and forth on the telephone instead of talking or listening. These devices are also called TDD. When messages are typed on the keyboard, they are sent over the phone line to a receiving TTY. The message is shown on a monitor.  Use text messaging, social media, and email if it is hard for you to communicate by telephone.  Try to learn a listening technique called speechreading. It is not lipreading. You pay attention to people's gestures, expressions, posture, and tone of voice. These clues can help you understand what a person is saying. Face the person you are talking to, and have them face you. Make sure the lighting is good. You need to see the other person's face clearly.  Think about counseling if you need help to adjust to your hearing loss.  When should you call for help?  Watch closely for  "changes in your health, and be sure to contact your doctor if:    You think your hearing is getting worse.     You have new symptoms, such as dizziness or nausea.   Where can you learn more?  Go to https://www.[employer].net/patiented  Enter R798 in the search box to learn more about \"Hearing Loss: Care Instructions.\"  Current as of: February 28, 2023               Content Version: 13.8    6877-7250 [employer].   Care instructions adapted under license by your healthcare professional. If you have questions about a medical condition or this instruction, always ask your healthcare professional. [employer] disclaims any warranty or liability for your use of this information.      Bladder Training: Care Instructions  Your Care Instructions     Bladder training is used to treat urge incontinence and stress incontinence. Urge incontinence means that the need to urinate comes on so fast that you can't get to a toilet in time. Stress incontinence means that you leak urine because of pressure on your bladder. For example, it may happen when you laugh, cough, or lift something heavy.  Bladder training can increase how long you can wait before you have to urinate. It can also help your bladder hold more urine. And it can give you better control over the urge to urinate.  It is important to remember that bladder training takes a few weeks to a few months to make a difference. You may not see results right away, but don't give up.  Follow-up care is a key part of your treatment and safety. Be sure to make and go to all appointments, and call your doctor if you are having problems. It's also a good idea to know your test results and keep a list of the medicines you take.  How can you care for yourself at home?  Work with your doctor to come up with a bladder training program that is right for you. You may use one or more of the following methods.  Delayed urination  In the beginning, try to keep from " "urinating for 5 minutes after you first feel the need to go.  While you wait, take deep, slow breaths to relax. Kegel exercises can also help you delay the need to go to the bathroom.  After some practice, when you can easily wait 5 minutes to urinate, try to wait 10 minutes before you urinate.  Slowly increase the waiting period until you are able to control when you have to urinate.  Scheduled urination  Empty your bladder when you first wake up in the morning.  Schedule times throughout the day when you will urinate.  Start by going to the bathroom every hour, even if you don't need to go.  Slowly increase the time between trips to the bathroom.  When you have found a schedule that works well for you, keep doing it.  If you wake up during the night and have to urinate, do it. Apply your schedule to waking hours only.  Kegel exercises  These tighten and strengthen pelvic muscles, which can help you control the flow of urine. (If doing these exercises causes pain, stop doing them and talk with your doctor.) To do Kegel exercises:  Squeeze your muscles as if you were trying not to pass gas. Or squeeze your muscles as if you were stopping the flow of urine. Your belly, legs, and buttocks shouldn't move.  Hold the squeeze for 3 seconds, then relax for 5 to 10 seconds.  Start with 3 seconds, then add 1 second each week until you are able to squeeze for 10 seconds.  Repeat the exercise 10 times a session. Do 3 to 8 sessions a day.  When should you call for help?  Watch closely for changes in your health, and be sure to contact your doctor if:    Your incontinence is getting worse.     You do not get better as expected.   Where can you learn more?  Go to https://www.healthQloo.net/patiented  Enter V684 in the search box to learn more about \"Bladder Training: Care Instructions.\"  Current as of: February 28, 2023               Content Version: 13.8    8757-5150 [employer].   Care instructions adapted under " license by your healthcare professional. If you have questions about a medical condition or this instruction, always ask your healthcare professional. [employer] disclaims any warranty or liability for your use of this information.      Your Health Risk Assessment indicates you feel you are not in good emotional health.    Recreation   Recreation is not limited to sports and team events. It includes any activity that provides relaxation, interest, enjoyment, and exercise. Recreation provides an outlet for physical, mental, and social energy. It can give a sense of worth and achievement. It can help you stay healthy.    Mental Exercise and Social Involvement  Mental and emotional health is as important as physical health. Keep in touch with friends and family. Stay as active as possible. Continue to learn and challenge yourself.   Things you can do to stay mentally active are:  Learn something new, like a foreign language or musical instrument.   Play SCRABBLE or do crossword puzzles. If you cannot find people to play these games with you at home, you can play them with others on your computer through the Internet.   Join a games club--anything from card games to chess or checkers or lawn bowling.   Start a new hobby.   Go back to school.   Volunteer.   Read.   Keep up with world events.

## 2023-11-16 NOTE — PROGRESS NOTES
"SUBJECTIVE:   Jenny is a 68 year old, presenting for the following:  Medicare Visit        11/16/2023     8:07 AM   Additional Questions   Roomed by Hawa BETANCOURT   Accompanied by Self         11/16/2023     8:07 AM   Patient Reported Additional Medications   Patient reports taking the following new medications .       Are you in the first 12 months of your Medicare coverage?  Yes,  Visual Acuity:  Right Eye: 20/25   Left Eye: 20/25  Both Eyes: 20/20 - with glasses    Healthy Habits:     In general, how would you rate your overall health?  Fair    Frequency of exercise:  2-3 days/week    Duration of exercise:  N/A    Do you usually eat at least 4 servings of fruit and vegetables a day, include whole grains    & fiber and avoid regularly eating high fat or \"junk\" foods?  Yes    Taking medications regularly:  Yes    Medication side effects:  Not applicable    Ability to successfully perform activities of daily living:  No assistance needed    Home Safety:  No safety concerns identified    Hearing Impairment:  Difficulty understanding soft or whispered speech    In the past 6 months, have you been bothered by leaking of urine? Yes    In general, how would you rate your overall mental or emotional health?  Fair      Today's PHQ-9 Score:       11/15/2023    11:02 AM   PHQ-9 SCORE   PHQ-9 Total Score MyChart 1 (Minimal depression)   PHQ-9 Total Score 1           Have you ever done Advance Care Planning? (For example, a Health Directive, POLST, or a discussion with a medical provider or your loved ones about your wishes): No, advance care planning information given to patient to review.  Patient plans to discuss their wishes with loved ones or provider.         Fall risk  Fallen 2 or more times in the past year?: No  Any fall with injury in the past year?: No    Cognitive Screening   1) Repeat 3 items (Leader, Season, Table)    2) Clock draw: NORMAL  3) 3 item recall: Recalls 2 objects   Results: NORMAL clock, 1-2 items " recalled: COGNITIVE IMPAIRMENT LESS LIKELY    Mini-CogTM Copyright AMINATA Warren. Licensed by the author for use in [facility]; reprinted with permission (kassie@[facility]). All rights reserved.      Do you have sleep apnea, excessive snoring or daytime drowsiness? : no    Reviewed and updated as needed this visit by clinical staff   Tobacco  Allergies  Meds  Problems  Med Hx  Surg Hx  Fam Hx          Reviewed and updated as needed this visit by Provider   Tobacco  Allergies  Meds  Problems  Med Hx  Surg Hx  Fam Hx         Social History     Tobacco Use    Smoking status: Former     Packs/day: 1.00     Years: 5.00     Additional pack years: 0.00     Total pack years: 5.00     Types: Cigarettes     Quit date: 5/10/1994     Years since quittin.5    Smokeless tobacco: Never    Tobacco comments:     quit    Substance Use Topics    Alcohol use: No             2023     4:40 PM   Alcohol Use   Prescreen: >3 drinks/day or >7 drinks/week? No     Do you have a current opioid prescription? No  Do you use any other controlled substances or medications that are not prescribed by a provider? None    Crohns   Following with [facility]  Doing well       Depression Followup  How are you doing with your depression since your last visit? No change  Are you having other symptoms that might be associated with depression? No  Have you had a significant life event?  OTHER: daughter moved back home and is a drug addict    Are you feeling anxious or having panic attacks?   No  Do you have any concerns with your use of alcohol or other drugs? No    Social History     Tobacco Use    Smoking status: Former     Packs/day: 1.00     Years: 5.00     Additional pack years: 0.00     Total pack years: 5.00     Types: Cigarettes     Quit date: 5/10/1994     Years since quittin.5    Smokeless tobacco: Never    Tobacco comments:     quit    Vaping Use    Vaping Use: Never used   Substance Use Topics    Alcohol use: No     Drug use: No         8/30/2022     2:27 PM 10/27/2023    11:22 AM 11/15/2023    11:02 AM   PHQ   PHQ-9 Total Score 3 4 1   Q9: Thoughts of better off dead/self-harm past 2 weeks Not at all Not at all Not at all         9/24/2019    10:22 AM 5/28/2021    10:11 AM 8/30/2022     2:27 PM   LUCIANA-7 SCORE   Total Score 0 (minimal anxiety)  1 (minimal anxiety)   Total Score 0 2 1         Suicide Assessment Five-step Evaluation and Treatment (SAFE-T)      Current providers sharing in care for this patient include:   Patient Care Team:  Yaima Aguilar MD as PCP - General  Yaima Aguilar MD as Assigned PCP    The following health maintenance items are reviewed in Epic and correct as of today:  Health Maintenance   Topic Date Due    DEXA  Never done    ZOSTER IMMUNIZATION (1 of 2) Never done    RSV VACCINE (Pregnancy & 60+) (1 - 1-dose 60+ series) Never done    Pneumococcal Vaccine: 65+ Years (1 - PCV) Never done    MEDICARE ANNUAL WELLNESS VISIT  08/30/2023    INFLUENZA VACCINE (1) 09/01/2023    COVID-19 Vaccine (4 - 2023-24 season) 09/01/2023    PHQ-9  05/16/2024    MAMMO SCREENING  10/12/2024    ANNUAL REVIEW OF HM ORDERS  11/16/2024    FALL RISK ASSESSMENT  11/16/2024    LIPID  05/28/2026    COLORECTAL CANCER SCREENING  07/18/2028    ADVANCE CARE PLANNING  11/16/2028    DTAP/TDAP/TD IMMUNIZATION (3 - Td or Tdap) 09/24/2029    HEPATITIS C SCREENING  Completed    DEPRESSION ACTION PLAN  Completed    IPV IMMUNIZATION  Aged Out    HPV IMMUNIZATION  Aged Out    MENINGITIS IMMUNIZATION  Aged Out    RSV MONOCLONAL ANTIBODY  Aged Out    PAP  Discontinued     Labs reviewed in EPIC          5/28/2021     9:05 AM   Breast CA Risk Assessment (FHS-7)   Do you have a family history of breast, colon, or ovarian cancer? No / Unknown         Mammogram Screening: Recommended mammography every 1-2 years with patient discussion and risk factor consideration  Pertinent mammograms are reviewed under the imaging tab.    Review  "of Systems   Constitutional:  Negative for chills and fever.   HENT:  Negative for congestion, ear pain, hearing loss and sore throat.    Eyes:  Negative for pain and visual disturbance.   Respiratory:  Negative for cough and shortness of breath.    Cardiovascular:  Negative for chest pain, palpitations and peripheral edema.   Gastrointestinal:  Positive for abdominal pain and diarrhea. Negative for constipation, heartburn, hematochezia and nausea.   Breasts:  Negative for tenderness, breast mass and discharge.   Genitourinary:  Negative for dysuria, frequency, genital sores, hematuria, pelvic pain, urgency, vaginal bleeding and vaginal discharge.   Musculoskeletal:  Negative for arthralgias, joint swelling and myalgias.   Skin:  Negative for rash.   Neurological:  Negative for dizziness, weakness, headaches and paresthesias.   Psychiatric/Behavioral:  Negative for mood changes. The patient is not nervous/anxious.      Above due to crohns    OBJECTIVE:   /74   Pulse 64   Temp 97  F (36.1  C) (Tympanic)   Resp 12   Ht 1.588 m (5' 2.5\")   Wt 67.6 kg (149 lb)   LMP 06/14/2006   SpO2 96%   BMI 26.82 kg/m   Estimated body mass index is 26.82 kg/m  as calculated from the following:    Height as of this encounter: 1.588 m (5' 2.5\").    Weight as of this encounter: 67.6 kg (149 lb).  Physical Exam  GENERAL APPEARANCE: healthy, alert and no distress  EYES: Eyes grossly normal to inspection, PERRL and conjunctivae and sclerae normal  HENT: ear canals and TM's normal, nose and mouth without ulcers or lesions, oropharynx clear and oral mucous membranes moist  NECK: no adenopathy, no asymmetry, masses, or scars and thyroid normal to palpation  RESP: lungs clear to auscultation - no rales, rhonchi or wheezes  CV: regular rate and rhythm, normal S1 S2, no S3 or S4, no murmur, click or rub, no peripheral edema and peripheral pulses strong  ABDOMEN: soft, nontender, no hepatosplenomegaly, no masses and bowel sounds " normal  MS: no musculoskeletal defects are noted and gait is age appropriate without ataxia  SKIN: no suspicious lesions or rashes  NEURO: Normal strength and tone, sensory exam grossly normal, mentation intact and speech normal  PSYCH: mentation appears normal and affect normal/bright    Diagnostic Test Results:  Labs reviewed in Epic    ASSESSMENT / PLAN:   (Z00.00) Encounter for Medicare annual wellness exam  (primary encounter diagnosis)  Comment:   Plan:     (F33.0) Major depressive disorder, recurrent episode, mild (H24)  Comment: Stable no change in treatment plan.   Plan: citalopram (CELEXA) 20 MG tablet, traZODone         (DESYREL) 50 MG tablet            (K50.90) Crohn's disease without complication, unspecified gastrointestinal tract location (H)  Comment: following with [facility]   Reviewed colonoscopy   Plan: cholestyramine light (PREVALITE) 4 GM powder            (Z78.0) Postmenopausal status  Comment:   Plan: DEXA HIP/PELVIS/SPINE - Future            Patient has been advised of split billing requirements and indicates understanding: Yes      COUNSELING:  Reviewed preventive health counseling, as reflected in patient instructions        She reports that she quit smoking about 29 years ago. Her smoking use included cigarettes. She has a 5.00 pack-year smoking history. She has never used smokeless tobacco.      Appropriate preventive services were discussed with this patient, including applicable screening as appropriate for fall prevention, nutrition, physical activity, Tobacco-use cessation, weight loss and cognition.  Checklist reviewing preventive services available has been given to the patient.    Reviewed patients plan of care and provided an AVS. The Basic Care Plan (routine screening as documented in Health Maintenance) for Jenny meets the Care Plan requirement. This Care Plan has been established and reviewed with the Patient.          Yaima Aguilar MD  [facility]  [facility]    Identified Health Risks:  I have reviewed Opioid Use Disorder and Substance Use Disorder risk factors and made any needed referrals.   Answers submitted by the patient for this visit:  Patient Health Questionnaire (Submitted on 11/15/2023)  If you checked off any problems, how difficult have these problems made it for you to do your work, take care of things at home, or get along with other people?: Not difficult at all  PHQ9 TOTAL SCORE: 1

## 2023-11-16 NOTE — NURSING NOTE
"Chief Complaint   Patient presents with    Medicare Visit     /74   Pulse 64   Temp 97  F (36.1  C) (Tympanic)   Resp 12   Ht 1.588 m (5' 2.5\")   Wt 67.6 kg (149 lb)   LMP 06/14/2006   SpO2 96%   BMI 26.82 kg/m   Estimated body mass index is 26.82 kg/m  as calculated from the following:    Height as of this encounter: 1.588 m (5' 2.5\").    Weight as of this encounter: 67.6 kg (149 lb).  Patient presents to the clinic using No DME      Health Maintenance that is potentially due pending provider review:    Health Maintenance Due   Topic Date Due    DEXA  Never done    ANNUAL REVIEW OF  ORDERS  Never done    ZOSTER IMMUNIZATION (1 of 2) Never done    LUNG CANCER SCREENING  Never done    RSV VACCINE (Pregnancy & 60+) (1 - 1-dose 60+ series) Never done    Pneumococcal Vaccine: 65+ Years (1 - PCV) Never done    MEDICARE ANNUAL WELLNESS VISIT  08/30/2023    INFLUENZA VACCINE (1) 09/01/2023    COVID-19 Vaccine (4 - 2023-24 season) 09/01/2023        Declines flu and covid.          "

## 2023-11-16 NOTE — PROGRESS NOTES
"The patient was provided with suggestions to help her develop a healthy physical lifestyle.  The patient was provided with written information regarding signs of hearing loss.  Information on urinary incontinence and treatment options given to patient.  The patient was provided with suggestions to help her develop a healthy emotional lifestyle.  Answers submitted by the patient for this visit:  Patient Health Questionnaire (Submitted on 11/15/2023)  If you checked off any problems, how difficult have these problems made it for you to do your work, take care of things at home, or get along with other people?: Not difficult at all  PHQ9 TOTAL SCORE: 1  Annual Preventive Visit (Submitted on 11/13/2023)  Chief Complaint: Annual Exam:  In general, how would you rate your overall physical health?: fair  Frequency of exercise:: 2-3 days/week  Do you usually eat at least 4 servings of fruit and vegetables a day, include whole grains & fiber, and avoid regularly eating high fat or \"junk\" foods? : Yes  Taking medications regularly:: Yes  Medication side effects:: Not applicable  Activities of Daily Living: no assistance needed  Home safety: no safety concerns identified  Hearing Impairment:: difficulty understanding soft or whispered speech  In the past 6 months, have you been bothered by leaking of urine?: Yes  abdominal pain: Yes  Blood in stool: No  Blood in urine: No  chest pain: No  chills: No  congestion: No  constipation: No  cough: No  diarrhea: Yes  dizziness: No  ear pain: No  eye pain: No  nervous/anxious: No  fever: No  frequency: No  genital sores: No  headaches: No  hearing loss: No  heartburn: No  arthralgias: No  joint swelling: No  peripheral edema: No  mood changes: No  myalgias: No  nausea: No  dysuria: No  palpitations: No  Skin sensation changes: No  sore throat: No  urgency: No  rash: No  shortness of breath: No  visual disturbance: No  weakness: No  pelvic pain: No  vaginal bleeding: No  vaginal " discharge: No  tenderness: No  breast mass: No  breast discharge: No  In general, how would you rate your overall mental or emotional health?: fair  Exercise outside of work (Submitted on 11/13/2023)  Chief Complaint: Annual Exam:  Duration of exercise:: N/A

## 2023-11-21 ENCOUNTER — HOSPITAL ENCOUNTER (OUTPATIENT)
Dept: MRI IMAGING | Facility: CLINIC | Age: 68
Discharge: HOME | End: 2023-11-21
Attending: PHYSICIAN ASSISTANT | Admitting: PHYSICIAN ASSISTANT
Payer: COMMERCIAL

## 2023-11-21 DIAGNOSIS — K50.90 CROHN DISEASE (H): ICD-10-CM

## 2023-11-21 PROCEDURE — 250N000011 HC RX IP 250 OP 636: Performed by: RADIOLOGY

## 2023-11-21 PROCEDURE — 258N000003 HC RX IP 258 OP 636

## 2023-11-21 PROCEDURE — A9585 GADOBUTROL INJECTION: HCPCS

## 2023-11-21 PROCEDURE — 255N000002 HC RX 255 OP 636

## 2023-11-21 PROCEDURE — 74183 MRI ABD W/O CNTR FLWD CNTR: CPT

## 2023-11-21 RX ORDER — GADOBUTROL 604.72 MG/ML
6.5 INJECTION INTRAVENOUS ONCE
Status: COMPLETED | OUTPATIENT
Start: 2023-11-21 | End: 2023-11-21

## 2023-11-21 RX ADMIN — SODIUM CHLORIDE 50 ML: 9 INJECTION, SOLUTION INTRAVENOUS at 11:19

## 2023-11-21 RX ADMIN — GADOBUTROL 6.5 ML: 604.72 INJECTION INTRAVENOUS at 11:31

## 2023-11-21 RX ADMIN — GLUCAGON 1 MG: 1 INJECTION, POWDER, LYOPHILIZED, FOR SOLUTION INTRAMUSCULAR; INTRAVENOUS at 11:25

## 2023-11-27 ENCOUNTER — TRANSFERRED RECORDS (OUTPATIENT)
Dept: HEALTH INFORMATION MANAGEMENT | Facility: CLINIC | Age: 68
End: 2023-11-27
Payer: COMMERCIAL

## 2024-02-26 ENCOUNTER — MYC MEDICAL ADVICE (OUTPATIENT)
Dept: FAMILY MEDICINE | Facility: CLINIC | Age: 69
End: 2024-02-26
Payer: COMMERCIAL

## 2024-03-18 ENCOUNTER — TRANSFERRED RECORDS (OUTPATIENT)
Dept: HEALTH INFORMATION MANAGEMENT | Facility: CLINIC | Age: 69
End: 2024-03-18
Payer: COMMERCIAL

## 2024-03-18 DIAGNOSIS — F33.0 MAJOR DEPRESSIVE DISORDER, RECURRENT EPISODE, MILD (H): ICD-10-CM

## 2024-03-18 RX ORDER — TRAZODONE HYDROCHLORIDE 50 MG/1
TABLET, FILM COATED ORAL
Qty: 30 TABLET | Refills: 0 | Status: SHIPPED | OUTPATIENT
Start: 2024-03-18 | End: 2024-04-16

## 2024-04-15 DIAGNOSIS — F33.0 MAJOR DEPRESSIVE DISORDER, RECURRENT EPISODE, MILD (H): ICD-10-CM

## 2024-04-15 NOTE — TELEPHONE ENCOUNTER
Routing refill request to provider for review/approval because:  Drug interaction warning    Requested Prescriptions   Pending Prescriptions Disp Refills    traZODone (DESYREL) 50 MG tablet [Pharmacy Med Name: traZODone HCl 50 MG Oral Tablet] 30 tablet 0     Sig: TAKE 1 TABLET BY MOUTH AT BEDTIME       Serotonin Modulators Passed - 4/15/2024  6:01 AM        Passed - Recent (12 mo) or future (30 days) visit within the authorizing provider's specialty     The patient must have completed an in-person or virtual visit within the past 12 months or has a future visit scheduled within the next 90 days with the authorizing provider s specialty.  Urgent care and e-visits do not quality as an office visit for this protocol.          Passed - Medication is active on med list        Passed - Patient is age 18 or older        Passed - No active pregnancy on record        Passed - No positive pregnancy test in past 12 months

## 2024-04-16 RX ORDER — TRAZODONE HYDROCHLORIDE 50 MG/1
TABLET, FILM COATED ORAL
Qty: 30 TABLET | Refills: 0 | Status: SHIPPED | OUTPATIENT
Start: 2024-04-16 | End: 2024-05-03

## 2024-05-03 ENCOUNTER — OFFICE VISIT (OUTPATIENT)
Dept: FAMILY MEDICINE | Facility: CLINIC | Age: 69
End: 2024-05-03
Payer: COMMERCIAL

## 2024-05-03 ENCOUNTER — ANCILLARY PROCEDURE (OUTPATIENT)
Dept: GENERAL RADIOLOGY | Facility: CLINIC | Age: 69
End: 2024-05-03
Attending: FAMILY MEDICINE
Payer: COMMERCIAL

## 2024-05-03 VITALS
TEMPERATURE: 97.5 F | BODY MASS INDEX: 28.26 KG/M2 | DIASTOLIC BLOOD PRESSURE: 78 MMHG | RESPIRATION RATE: 16 BRPM | HEART RATE: 65 BPM | OXYGEN SATURATION: 98 % | SYSTOLIC BLOOD PRESSURE: 120 MMHG | WEIGHT: 157 LBS

## 2024-05-03 DIAGNOSIS — G89.29 CHRONIC PAIN OF LEFT KNEE: Primary | ICD-10-CM

## 2024-05-03 DIAGNOSIS — F33.42 RECURRENT MAJOR DEPRESSIVE DISORDER, IN FULL REMISSION (H): ICD-10-CM

## 2024-05-03 DIAGNOSIS — Z13.6 ENCOUNTER FOR SCREENING FOR VASCULAR DISEASE: ICD-10-CM

## 2024-05-03 DIAGNOSIS — Z09 ENCOUNTER FOR FOLLOW-UP EXAMINATION AFTER COMPLETED TREATMENT FOR CONDITIONS OTHER THAN MALIGNANT NEOPLASM: ICD-10-CM

## 2024-05-03 DIAGNOSIS — K50.90 CROHN'S DISEASE WITHOUT COMPLICATION, UNSPECIFIED GASTROINTESTINAL TRACT LOCATION (H): ICD-10-CM

## 2024-05-03 DIAGNOSIS — M25.562 CHRONIC PAIN OF LEFT KNEE: Primary | ICD-10-CM

## 2024-05-03 DIAGNOSIS — M25.562 CHRONIC PAIN OF LEFT KNEE: ICD-10-CM

## 2024-05-03 DIAGNOSIS — G89.29 CHRONIC PAIN OF LEFT KNEE: ICD-10-CM

## 2024-05-03 PROCEDURE — 73562 X-RAY EXAM OF KNEE 3: CPT | Mod: TC | Performed by: RADIOLOGY

## 2024-05-03 PROCEDURE — 99214 OFFICE O/P EST MOD 30 MIN: CPT | Performed by: FAMILY MEDICINE

## 2024-05-03 PROCEDURE — G2211 COMPLEX E/M VISIT ADD ON: HCPCS | Performed by: FAMILY MEDICINE

## 2024-05-03 RX ORDER — CHOLESTYRAMINE LIGHT 4 G/5.7G
4 POWDER, FOR SUSPENSION ORAL DAILY
Qty: 231 G | Refills: 11 | Status: SHIPPED | OUTPATIENT
Start: 2024-05-03

## 2024-05-03 RX ORDER — TRAZODONE HYDROCHLORIDE 50 MG/1
TABLET, FILM COATED ORAL
Qty: 30 TABLET | Refills: 11 | Status: SHIPPED | OUTPATIENT
Start: 2024-05-03

## 2024-05-03 RX ORDER — CITALOPRAM HYDROBROMIDE 20 MG/1
20 TABLET ORAL DAILY
Qty: 90 TABLET | Refills: 3 | Status: SHIPPED | OUTPATIENT
Start: 2024-05-03

## 2024-05-03 ASSESSMENT — PATIENT HEALTH QUESTIONNAIRE - PHQ9: SUM OF ALL RESPONSES TO PHQ QUESTIONS 1-9: 0

## 2024-05-03 ASSESSMENT — PAIN SCALES - GENERAL: PAINLEVEL: NO PAIN (0)

## 2024-05-03 NOTE — PROGRESS NOTES
"  Assessment & Plan     Chronic pain of left knee  Likely early OA   Trial of off loading brace - PT   - XR Knee Left 3 Views; Future  - Physical Therapy  Referral; Future    Crohn's disease without complication, unspecified gastrointestinal tract location (H)  Stable no change in treatment plan.   - cholestyramine light (PREVALITE) 4 GM powder; Take 1 packet (4 g) by mouth daily    Encounter for screening for vascular disease    - Comprehensive metabolic panel; Future  - Lipid panel reflex to direct LDL Fasting; Future    Encounter for follow-up examination after completed treatment for conditions other than malignant neoplasm  Abnormal home screening   Need to verify   - US KVNG Doppler with Exercise Bilateral; Future        Major depressive disorder, recurrent episode, mild (H24)  In remission   - citalopram (CELEXA) 20 MG tablet; Take 1 tablet (20 mg) by mouth daily  - traZODone (DESYREL) 50 MG tablet; TAKE 1 TABLET BY MOUTH AT BEDTIME            BMI  Estimated body mass index is 28.26 kg/m  as calculated from the following:    Height as of 11/16/23: 1.588 m (5' 2.5\").    Weight as of this encounter: 71.2 kg (157 lb).             Gus Alvarado is a 68 year old, presenting for the following health issues:  Results (Home visit for wellness visit)      5/3/2024    10:42 AM   Additional Questions   Roomed by Hawa BETANCOURT   Accompanied by Self         5/3/2024    10:42 AM   Patient Reported Additional Medications   Patient reports taking the following new medications .     History of Present Illness       Reason for visit:  PAD  Symptom onset:  More than a month  Symptoms include:  Other than pain in my knee, non-  Symptom intensity:  Moderate  Symptom progression:  Staying the same  Had these symptoms before:  Yes  Has tried/received treatment for these symptoms:  No  What makes it worse:  Exercise  What makes it better:  Tylenol    She eats 0-1 servings of fruits and vegetables daily.She consumes 1 " sweetened beverage(s) daily.She exercises with enough effort to increase her heart rate 20 to 29 minutes per day.  She exercises with enough effort to increase her heart rate 3 or less days per week.   She is taking medications regularly.     Discuss [employer] screening from home visit through insurance  Pt had home screening from an RN told she had mild PAD   She has no sxs of claudication     Depression   How are you doing with your depression since your last visit? No change  Are you having other symptoms that might be associated with depression? No  Have you had a significant life event?  No   Are you feeling anxious or having panic attacks?   No  Do you have any concerns with your use of alcohol or other drugs? No    Social History     Tobacco Use    Smoking status: Former     Current packs/day: 0.00     Average packs/day: 1 pack/day for 5.0 years (5.0 ttl pk-yrs)     Types: Cigarettes     Start date: 5/10/1989     Quit date: 5/10/1994     Years since quittin.0    Smokeless tobacco: Never    Tobacco comments:     quit    Vaping Use    Vaping status: Never Used   Substance Use Topics    Alcohol use: No    Drug use: No         2022     2:27 PM 10/27/2023    11:22 AM 11/15/2023    11:02 AM   PHQ   PHQ-9 Total Score 3 4 1   Q9: Thoughts of better off dead/self-harm past 2 weeks Not at all Not at all Not at all         2019    10:22 AM 2021    10:11 AM 2022     2:27 PM   LUCIANA-7 SCORE   Total Score 0 (minimal anxiety)  1 (minimal anxiety)   Total Score 0 2 1         Suicide Assessment Five-step Evaluation and Treatment (SAFE-T)      Musculoskeletal problem/pain    Duration: many months   Description  Location: left knee  Intensity:  moderate  Accompanying signs and symptoms: none  History  Previous similar problem: no   Previous evaluation:  none  Precipitating or alleviating factors:  Trauma or overuse: YES- walking or more acitivity makes it worse  Aggravating factors  include: walking, climbing stairs, exercise, and overuse  Therapies tried and outcome: rest/inactivity               Review of Systems  Constitutional, HEENT, cardiovascular, pulmonary, gi and gu systems are negative, except as otherwise noted.      Objective    /78   Pulse 65   Temp 97.5  F (36.4  C) (Tympanic)   Resp 16   Wt 71.2 kg (157 lb)   LMP 06/14/2006   SpO2 98%   BMI 28.26 kg/m    Body mass index is 28.26 kg/m .  Physical Exam   GENERAL: alert and no distress  MS: no gross musculoskeletal defects noted, no edema  Pulses palpable bilateral PT and DP   ORTHO: Knee Exam: Inspection: AP/lateral alignment normal  Tender: medial patellar facet, medial joint line  Non-tender:   Active Range of Motion: all normal  Strength: intact   Special tests: negative Lachman's test, negative Farideh's          Xray - Reviewed and interpreted by me.  negative        Signed Electronically by: Yaima Aguilar MD

## 2024-05-03 NOTE — NURSING NOTE
"Chief Complaint   Patient presents with    Results     Home visit for wellness visit     /78   Pulse 65   Temp 97.5  F (36.4  C) (Tympanic)   Resp 16   Wt 71.2 kg (157 lb)   LMP 06/14/2006   SpO2 98%   BMI 28.26 kg/m   Estimated body mass index is 28.26 kg/m  as calculated from the following:    Height as of 11/16/23: 1.588 m (5' 2.5\").    Weight as of this encounter: 71.2 kg (157 lb).  Patient presents to the clinic using No DME      Health Maintenance that is potentially due pending provider review:    Health Maintenance Due   Topic Date Due    DEXA  Never done    ZOSTER IMMUNIZATION (1 of 2) Never done    RSV VACCINE (Pregnancy & 60+) (1 - 1-dose 60+ series) Never done    Pneumococcal Vaccine: 65+ Years (1 of 1 - PCV) Never done    COVID-19 Vaccine (4 - 2023-24 season) 09/01/2023    PHQ-9  05/16/2024                  "

## 2024-07-15 ENCOUNTER — TELEPHONE (OUTPATIENT)
Dept: FAMILY MEDICINE | Facility: CLINIC | Age: 69
End: 2024-07-15
Payer: COMMERCIAL

## 2024-07-15 NOTE — TELEPHONE ENCOUNTER
Patient Quality Outreach    Patient is due for the following:   Physical Annual Wellness Visit    Next Steps:   Schedule a Annual Wellness Visit    Type of outreach:    Phone, left message for patient/parent to call back.      Questions for provider review:    None           Chastity Garcia [facility]  Chart routed to Care Team.

## 2024-09-12 ENCOUNTER — PATIENT OUTREACH (OUTPATIENT)
Dept: CARE COORDINATION | Facility: CLINIC | Age: 69
End: 2024-09-12
Payer: COMMERCIAL

## 2024-10-10 ENCOUNTER — PATIENT OUTREACH (OUTPATIENT)
Dept: CARE COORDINATION | Facility: CLINIC | Age: 69
End: 2024-10-10
Payer: COMMERCIAL

## 2024-10-17 ENCOUNTER — PATIENT OUTREACH (OUTPATIENT)
Dept: CARE COORDINATION | Facility: CLINIC | Age: 69
End: 2024-10-17
Payer: COMMERCIAL

## 2024-11-09 SDOH — HEALTH STABILITY: PHYSICAL HEALTH: ON AVERAGE, HOW MANY DAYS PER WEEK DO YOU ENGAGE IN MODERATE TO STRENUOUS EXERCISE (LIKE A BRISK WALK)?: 3 DAYS

## 2024-11-09 SDOH — HEALTH STABILITY: PHYSICAL HEALTH: ON AVERAGE, HOW MANY MINUTES DO YOU ENGAGE IN EXERCISE AT THIS LEVEL?: 40 MIN

## 2024-11-09 ASSESSMENT — SOCIAL DETERMINANTS OF HEALTH (SDOH): HOW OFTEN DO YOU GET TOGETHER WITH FRIENDS OR RELATIVES?: TWICE A WEEK

## 2024-11-12 ENCOUNTER — OFFICE VISIT (OUTPATIENT)
Dept: FAMILY MEDICINE | Facility: CLINIC | Age: 69
End: 2024-11-12
Attending: FAMILY MEDICINE
Payer: COMMERCIAL

## 2024-11-12 VITALS
DIASTOLIC BLOOD PRESSURE: 74 MMHG | BODY MASS INDEX: 27.64 KG/M2 | RESPIRATION RATE: 14 BRPM | HEART RATE: 72 BPM | TEMPERATURE: 97.6 F | SYSTOLIC BLOOD PRESSURE: 118 MMHG | HEIGHT: 63 IN | WEIGHT: 156 LBS | OXYGEN SATURATION: 98 %

## 2024-11-12 DIAGNOSIS — Z12.31 VISIT FOR SCREENING MAMMOGRAM: Primary | ICD-10-CM

## 2024-11-12 DIAGNOSIS — K50.90 CROHN'S DISEASE WITHOUT COMPLICATION, UNSPECIFIED GASTROINTESTINAL TRACT LOCATION (H): ICD-10-CM

## 2024-11-12 DIAGNOSIS — Z00.00 ENCOUNTER FOR MEDICARE ANNUAL WELLNESS EXAM: ICD-10-CM

## 2024-11-12 DIAGNOSIS — Z78.0 POSTMENOPAUSAL STATUS: ICD-10-CM

## 2024-11-12 RX ORDER — CHOLESTYRAMINE LIGHT 4 G/5.7G
4 POWDER, FOR SUSPENSION ORAL DAILY
Qty: 231 G | Refills: 11 | Status: SHIPPED | OUTPATIENT
Start: 2024-11-12 | End: 2024-11-12 | Stop reason: ALTCHOICE

## 2024-11-12 RX ORDER — CHOLESTYRAMINE 4 G/9G
1 POWDER, FOR SUSPENSION ORAL DAILY
Qty: 60 EACH | Refills: 6 | Status: SHIPPED | OUTPATIENT
Start: 2024-11-12

## 2024-11-12 ASSESSMENT — PATIENT HEALTH QUESTIONNAIRE - PHQ9
SUM OF ALL RESPONSES TO PHQ QUESTIONS 1-9: 0
SUM OF ALL RESPONSES TO PHQ QUESTIONS 1-9: 0
10. IF YOU CHECKED OFF ANY PROBLEMS, HOW DIFFICULT HAVE THESE PROBLEMS MADE IT FOR YOU TO DO YOUR WORK, TAKE CARE OF THINGS AT HOME, OR GET ALONG WITH OTHER PEOPLE: NOT DIFFICULT AT ALL

## 2024-11-12 ASSESSMENT — PAIN SCALES - GENERAL: PAINLEVEL_OUTOF10: NO PAIN (0)

## 2024-11-12 NOTE — PROGRESS NOTES
"Preventive Care Visit  [facility]  Yaima Aguilar MD, Family Medicine  Nov 12, 2024      Assessment & Plan     Crohn's disease without complication, unspecified gastrointestinal tract location (H)  Stable no change in treatment plan.   - cholestyramine (QUESTRAN) 4 g packet; Take 1 packet (4 g) by mouth daily.    Postmenopausal status    - DX Bone Density; Future    Encounter for Medicare annual wellness exam      Visit for screening mammogram      Patient has been advised of split billing requirements and indicates understanding: N/A        BMI  Estimated body mass index is 28.08 kg/m  as calculated from the following:    Height as of this encounter: 1.588 m (5' 2.5\").    Weight as of this encounter: 70.8 kg (156 lb).       Counseling  Appropriate preventive services were addressed with this patient via screening, questionnaire, or discussion as appropriate for fall prevention, nutrition, physical activity, Tobacco-use cessation, social engagement, weight loss and cognition.  Checklist reviewing preventive services available has been given to the patient.  Reviewed patient's diet, addressing concerns and/or questions.   She is at risk for lack of exercise and has been provided with information to increase physical activity for the benefit of her well-being.   The patient was provided with written information regarding signs of hearing loss.           Subjective   Jenny is a 69 year old, presenting for the following:  Wellness Visit        11/12/2024     2:01 PM   Additional Questions   Roomed by Hawa BETANCOURT   Accompanied by self         11/12/2024     2:01 PM   Patient Reported Additional Medications   Patient reports taking the following new medications .          HPI          Health Care Directive  Patient does not have a Health Care Directive: did not discuss       11/9/2024   General Health   How would you rate your overall physical health? Good   Feel stress (tense, anxious, or " unable to sleep) Not at all            11/9/2024   Nutrition   Diet: Carbohydrate counting    Gluten-free/reduced       Multiple values from one day are sorted in reverse-chronological order         11/9/2024   Exercise   Days per week of moderate/strenous exercise 3 days   Average minutes spent exercising at this level 40 min            11/9/2024   Social Factors   Frequency of gathering with friends or relatives Twice a week   Worry food won't last until get money to buy more No   Food not last or not have enough money for food? No   Do you have housing? (Housing is defined as stable permanent housing and does not include staying ouside in a car, in a tent, in an abandoned building, in an overnight shelter, or couch-surfing.) Yes   Are you worried about losing your housing? No   Lack of transportation? No   Unable to get utilities (heat,electricity)? No            11/9/2024   Fall Risk   Fallen 2 or more times in the past year? No    Trouble with walking or balance? No        Patient-reported          11/9/2024   Activities of Daily Living- Home Safety   Needs help with the following daily activites None of the above   Safety concerns in the home None of the above            11/9/2024   Dental   Dentist two times every year? Yes            11/9/2024   Hearing Screening   Hearing concerns? (!) I NEED TO ASK PEOPLE TO SPEAK UP OR REPEAT THEMSELVES.    (!) IT'S HARD TO FOLLOW A CONVERSATION IN A NOISY RESTAURANT OR CROWDED ROOM.    (!) TROUBLE UNDERSTANDING SOFT OR WHISPERED SPEECH.       Multiple values from one day are sorted in reverse-chronological order         11/9/2024   Driving Risk Screening   Patient/family members have concerns about driving No            11/9/2024   General Alertness/Fatigue Screening   Have you been more tired than usual lately? No            11/9/2024   Urinary Incontinence Screening   Bothered by leaking urine in past 6 months No            11/9/2024   TB Screening   Were you born  outside of the [place]? No          Today's PHQ-9 Score:       2024     1:56 PM   PHQ-9 SCORE   PHQ-9 Total Score MyChart 0   PHQ-9 Total Score 0        Patient-reported         2024   Substance Use   Alcohol more than 3/day or more than 7/wk No   Do you have a current opioid prescription? No   How severe/bad is pain from 1 to 10? 0/10 (No Pain)   Do you use any other substances recreationally? No        Social History     Tobacco Use    Smoking status: Former     Current packs/day: 0.00     Average packs/day: 1 pack/day for 5.0 years (5.0 ttl pk-yrs)     Types: Cigarettes     Start date: 5/10/1989     Quit date: 5/10/1994     Years since quittin.5    Smokeless tobacco: Never    Tobacco comments:     quit    Vaping Use    Vaping status: Never Used   Substance Use Topics    Alcohol use: No    Drug use: No           10/12/2022   LAST FHS-7 RESULTS   1st degree relative breast or ovarian cancer No   Any relative bilateral breast cancer No   Any male have breast cancer No   Any ONE woman have BOTH breast AND ovarian cancer No   Any woman with breast cancer before 50yrs No   2 or more relatives with breast AND/OR ovarian cancer No   2 or more relatives with breast AND/OR bowel cancer No           Mammogram Screening - Mammogram every 1-2 years updated in Health Maintenance based on mutual decision making      History of abnormal Pap smear: Status post hysterectomy with removal of cervix and no history of CIN2 or greater or cervical cancer. Health Maintenance and Surgical History updated.        2007    12:00 AM 2006    12:00 AM   PAP / HPV   PAP (Historical) NIL  NIL      ASCVD Risk   The 10-year ASCVD risk score (Jony COLUNGA, et al., 2019) is: 5.3%    Values used to calculate the score:      Age: 69 years      Sex: Female      Is Non- : No      Diabetic: No      Tobacco smoker: No      Systolic Blood Pressure: 118 mmHg      Is BP treated: No      HDL Cholesterol:  "76 mg/dL      Total Cholesterol: 202 mg/dL            Reviewed and updated as needed this visit by Provider   Tobacco  Allergies  Meds  Problems  Med Hx  Surg Hx  Fam Hx              Current providers sharing in care for this patient include:  Patient Care Team:  Yaima Aguilar MD as PCP - Yaima Lopez MD as Assigned PCP    The following health maintenance items are reviewed in Epic and correct as of today:  Health Maintenance   Topic Date Due    DEXA  Never done    ZOSTER IMMUNIZATION (1 of 2) Never done    Pneumococcal Vaccine: 65+ Years (1 of 1 - PCV) Never done    COVID-19 Vaccine (4 - 2024-25 season) 09/01/2024    MAMMO SCREENING  10/12/2024    PHQ-9  05/12/2025    MEDICARE ANNUAL WELLNESS VISIT  11/12/2025    ANNUAL REVIEW OF HM ORDERS  11/12/2025    FALL RISK ASSESSMENT  11/12/2025    LIPID  05/28/2026    GLUCOSE  11/02/2026    COLORECTAL CANCER SCREENING  07/18/2028    ADVANCE CARE PLANNING  11/16/2028    DTAP/TDAP/TD IMMUNIZATION (3 - Td or Tdap) 09/24/2029    RSV VACCINE (1 - 1-dose 75+ series) 10/30/2032    HEPATITIS C SCREENING  Completed    DEPRESSION ACTION PLAN  Completed    INFLUENZA VACCINE  Completed    HPV IMMUNIZATION  Aged Out    MENINGITIS IMMUNIZATION  Aged Out    RSV MONOCLONAL ANTIBODY  Aged Out    PAP  Discontinued         Review of Systems  Constitutional, HEENT, cardiovascular, pulmonary, gi and gu systems are negative, except as otherwise noted.     Objective    Exam  /74   Pulse 72   Temp 97.6  F (36.4  C) (Tympanic)   Resp 14   Ht 1.588 m (5' 2.5\")   Wt 70.8 kg (156 lb)   LMP 06/14/2006   SpO2 98%   BMI 28.08 kg/m     Estimated body mass index is 28.08 kg/m  as calculated from the following:    Height as of this encounter: 1.588 m (5' 2.5\").    Weight as of this encounter: 70.8 kg (156 lb).    Physical Exam  GENERAL: alert and no distress  EYES: Eyes grossly normal to inspection, PERRL and conjunctivae and sclerae normal  HENT: ear canals " and TM's normal, nose and mouth without ulcers or lesions  NECK: no adenopathy, no asymmetry, masses, or scars  RESP: lungs clear to auscultation - no rales, rhonchi or wheezes  CV: regular rate and rhythm, normal S1 S2, no S3 or S4, no murmur, click or rub, no peripheral edema  ABDOMEN: soft, nontender, no hepatosplenomegaly, no masses and bowel sounds normal  MS: no gross musculoskeletal defects noted, no edema  SKIN: no suspicious lesions or rashes  NEURO: Normal strength and tone, mentation intact and speech normal  PSYCH: mentation appears normal, affect normal/bright        11/12/2024   Mini Cog   Clock Draw Score 2 Normal   3 Item Recall 3 objects recalled   Mini Cog Total Score 5            Vision Screen  Patient wears corrective lenses (select all that apply): Worn during vision screen  Vision Screen Results: Pass      Signed Electronically by: Yaima Aguilar MD    Answers submitted by the patient for this visit:  Patient Health Questionnaire (Submitted on 11/12/2024)  If you checked off any problems, how difficult have these problems made it for you to do your work, take care of things at home, or get along with other people?: Not difficult at all  PHQ9 TOTAL SCORE: 0

## 2024-11-12 NOTE — NURSING NOTE
"Chief Complaint   Patient presents with    Wellness Visit     /74   Pulse 72   Temp 97.6  F (36.4  C) (Tympanic)   Resp 14   Ht 1.588 m (5' 2.5\")   Wt 70.8 kg (156 lb)   LMP 06/14/2006   SpO2 98%   BMI 28.08 kg/m   Estimated body mass index is 28.08 kg/m  as calculated from the following:    Height as of this encounter: 1.588 m (5' 2.5\").    Weight as of this encounter: 70.8 kg (156 lb).  Patient presents to the clinic using No DME      Health Maintenance that is potentially due pending provider review:    Health Maintenance Due   Topic Date Due    DEXA  Never done    ZOSTER IMMUNIZATION (1 of 2) Never done    Pneumococcal Vaccine: 65+ Years (1 of 1 - PCV) Never done    INFLUENZA VACCINE (1) 09/01/2024    COVID-19 Vaccine (4 - 2024-25 season) 09/01/2024    MAMMO SCREENING  10/12/2024    MEDICARE ANNUAL WELLNESS VISIT  11/16/2024    ANNUAL REVIEW OF HM ORDERS  11/16/2024                  "
yes

## 2024-11-13 ENCOUNTER — PATIENT OUTREACH (OUTPATIENT)
Dept: CARE COORDINATION | Facility: CLINIC | Age: 69
End: 2024-11-13
Payer: COMMERCIAL

## 2024-11-15 NOTE — PATIENT INSTRUCTIONS
Patient Education   Preventive Care Advice   This is general advice given by our system to help you stay healthy. However, your care team may have specific advice just for you. Please talk to your care team about your preventive care needs.  Nutrition  Eat 5 or more servings of fruits and vegetables each day.  Try wheat bread, brown rice and whole grain pasta (instead of white bread, rice, and pasta).  Get enough calcium and vitamin D. Check the label on foods and aim for 100% of the RDA (recommended daily allowance).  Lifestyle  Exercise at least 150 minutes each week  (30 minutes a day, 5 days a week).  Do muscle strengthening activities 2 days a week. These help control your weight and prevent disease.  No smoking.  Wear sunscreen to prevent skin cancer.  Have a dental exam and cleaning every 6 months.  Yearly exams  See your health care team every year to talk about:  Any changes in your health.  Any medicines your care team has prescribed.  Preventive care, family planning, and ways to prevent chronic diseases.  Shots (vaccines)   HPV shots (up to age 26), if you've never had them before.  Hepatitis B shots (up to age 59), if you've never had them before.  COVID-19 shot: Get this shot when it's due.  Flu shot: Get a flu shot every year.  Tetanus shot: Get a tetanus shot every 10 years.  Pneumococcal, hepatitis A, and RSV shots: Ask your care team if you need these based on your risk.  Shingles shot (for age 50 and up)  General health tests  Diabetes screening:  Starting at age 35, Get screened for diabetes at least every 3 years.  If you are younger than age 35, ask your care team if you should be screened for diabetes.  Cholesterol test: At age 39, start having a cholesterol test every 5 years, or more often if advised.  Bone density scan (DEXA): At age 50, ask your care team if you should have this scan for osteoporosis (brittle bones).  Hepatitis C: Get tested at least once in your life.  STIs (sexually  transmitted infections)  Before age 24: Ask your care team if you should be screened for STIs.  After age 24: Get screened for STIs if you're at risk. You are at risk for STIs (including HIV) if:  You are sexually active with more than one person.  You don't use condoms every time.  You or a partner was diagnosed with a sexually transmitted infection.  If you are at risk for HIV, ask about PrEP medicine to prevent HIV.  Get tested for HIV at least once in your life, whether you are at risk for HIV or not.  Cancer screening tests  Cervical cancer screening: If you have a cervix, begin getting regular cervical cancer screening tests starting at age 21.  Breast cancer scan (mammogram): If you've ever had breasts, begin having regular mammograms starting at age 40. This is a scan to check for breast cancer.  Colon cancer screening: It is important to start screening for colon cancer at age 45.  Have a colonoscopy test every 10 years (or more often if you're at risk) Or, ask your provider about stool tests like a FIT test every year or Cologuard test every 3 years.  To learn more about your testing options, visit:   .  For help making a decision, visit:   https://bit.ly/uc15827.  Prostate cancer screening test: If you have a prostate, ask your care team if a prostate cancer screening test (PSA) at age 55 is right for you.  Lung cancer screening: If you are a current or former smoker ages 50 to 80, ask your care team if ongoing lung cancer screenings are right for you.  For informational purposes only. Not to replace the advice of your health care provider. Copyright   2023 [facility] [place]. All rights reserved. Clinically reviewed by the [facility]itions [facility]. [employer] [id number] - REV 01/24.  Hearing Loss: Care Instructions  Overview     Hearing loss is a sudden or slow decrease in how well you hear. It can range from slight to profound. Permanent hearing loss can occur with aging. It also can  happen when you are exposed long-term to loud noise. Examples include listening to loud music, riding motorcycles, or being around other loud machines.  Hearing loss can affect your work and home life. It can make you feel lonely or depressed. You may feel that you have lost your independence. But hearing aids and other devices can help you hear better and feel connected to others.  Follow-up care is a key part of your treatment and safety. Be sure to make and go to all appointments, and call your doctor if you are having problems. It's also a good idea to know your test results and keep a list of the medicines you take.  How can you care for yourself at home?  Avoid loud noises whenever possible. This helps keep your hearing from getting worse.  Always wear hearing protection around loud noises.  Wear a hearing aid as directed.  A professional can help you pick a hearing aid that will work best for you.  You can also get hearing aids over the counter for mild to moderate hearing loss.  Have hearing tests as your doctor suggests. They can show whether your hearing has changed. Your hearing aid may need to be adjusted.  Use other devices as needed. These may include:  Telephone amplifiers and hearing aids that can connect to a television, stereo, radio, or microphone.  Devices that use lights or vibrations. These alert you to the doorbell, a ringing telephone, or a baby monitor.  Television closed-captioning. This shows the words at the bottom of the screen. Most new TVs can do this.  TTY (text telephone). This lets you type messages back and forth on the telephone instead of talking or listening. These devices are also called TDD. When messages are typed on the keyboard, they are sent over the phone line to a receiving TTY. The message is shown on a monitor.  Use text messaging, social media, and email if it is hard for you to communicate by telephone.  Try to learn a listening technique called speechreading. It is  "not lipreading. You pay attention to people's gestures, expressions, posture, and tone of voice. These clues can help you understand what a person is saying. Face the person you are talking to, and have them face you. Make sure the lighting is good. You need to see the other person's face clearly.  Think about counseling if you need help to adjust to your hearing loss.  When should you call for help?  Watch closely for changes in your health, and be sure to contact your doctor if:    You think your hearing is getting worse.     You have new symptoms, such as dizziness or nausea.   Where can you learn more?  Go to https://www.Nurs[employer].net/patiented  Enter R798 in the search box to learn more about \"Hearing Loss: Care Instructions.\"  Current as of: September 27, 2023  Content Version: 14.2 2024 [facility] [employer].   Care instructions adapted under license by your healthcare professional. If you have questions about a medical condition or this instruction, always ask your healthcare professional. [employer] disclaims any warranty or liability for your use of this information.       "

## 2024-12-17 DIAGNOSIS — K50.90 CROHN'S DISEASE WITHOUT COMPLICATION, UNSPECIFIED GASTROINTESTINAL TRACT LOCATION (H): Primary | ICD-10-CM

## 2024-12-17 RX ORDER — CHOLESTYRAMINE 4 G/9G
4 POWDER, FOR SUSPENSION ORAL DAILY
Qty: 378 G | Refills: 11 | Status: SHIPPED | OUTPATIENT
Start: 2024-12-17

## 2024-12-17 NOTE — TELEPHONE ENCOUNTER
Pls see TE below. RX pended, message routed to Dr. Yaima Aguilar for consideration.     Julie Behrendt RN

## 2024-12-17 NOTE — TELEPHONE ENCOUNTER
New Medication Request        What medication are you requesting?: patient called stating that cholestyramine is too expensive. Patient checked with pharmacy and reports that cholestrom powder is much cheaper.       Preferred Pharmacy:  [facility] [address] [place] [address] - [address] [facility]  [address] [facility] [address]  Phone: [phone] Fax: [phone]      Could we send this information to you in [employer] or would you prefer to receive a phone call?:   Patient would prefer a phone call   Okay to leave a detailed message?: Yes at Cell number on file:    Telephone Information:   Mobile [phone]

## 2024-12-29 ENCOUNTER — HEALTH MAINTENANCE LETTER (OUTPATIENT)
Age: 69
End: 2024-12-29

## 2025-01-29 ENCOUNTER — ANCILLARY PROCEDURE (OUTPATIENT)
Dept: MAMMOGRAPHY | Facility: CLINIC | Age: 70
End: 2025-01-29
Payer: COMMERCIAL

## 2025-01-29 DIAGNOSIS — Z12.31 VISIT FOR SCREENING MAMMOGRAM: ICD-10-CM

## 2025-01-29 PROCEDURE — 77063 BREAST TOMOSYNTHESIS BI: CPT | Mod: TC | Performed by: RADIOLOGY

## 2025-01-29 PROCEDURE — 77067 SCR MAMMO BI INCL CAD: CPT | Mod: TC | Performed by: RADIOLOGY

## 2025-05-26 DIAGNOSIS — F33.42 RECURRENT MAJOR DEPRESSIVE DISORDER, IN FULL REMISSION: Primary | ICD-10-CM

## 2025-05-27 RX ORDER — TRAZODONE HYDROCHLORIDE 50 MG/1
50 TABLET ORAL AT BEDTIME
Qty: 90 TABLET | Refills: 3 | Status: SHIPPED | OUTPATIENT
Start: 2025-05-27

## 2025-07-19 DIAGNOSIS — F33.42 RECURRENT MAJOR DEPRESSIVE DISORDER, IN FULL REMISSION: Primary | ICD-10-CM

## 2025-07-21 RX ORDER — CITALOPRAM HYDROBROMIDE 20 MG/1
20 TABLET ORAL DAILY
Qty: 90 TABLET | Refills: 1 | Status: SHIPPED | OUTPATIENT
Start: 2025-07-21

## 2025-07-30 ENCOUNTER — TRANSFERRED RECORDS (OUTPATIENT)
Dept: HEALTH INFORMATION MANAGEMENT | Facility: CLINIC | Age: 70
End: 2025-07-30
Payer: COMMERCIAL